# Patient Record
Sex: FEMALE | Race: WHITE | Employment: FULL TIME | URBAN - METROPOLITAN AREA
[De-identification: names, ages, dates, MRNs, and addresses within clinical notes are randomized per-mention and may not be internally consistent; named-entity substitution may affect disease eponyms.]

---

## 2017-01-16 ENCOUNTER — ROUTINE PRENATAL (OUTPATIENT)
Dept: OBGYN | Age: 27
End: 2017-01-16

## 2017-01-16 VITALS
DIASTOLIC BLOOD PRESSURE: 60 MMHG | SYSTOLIC BLOOD PRESSURE: 102 MMHG | HEART RATE: 55 BPM | WEIGHT: 142 LBS | BODY MASS INDEX: 22.92 KG/M2

## 2017-01-16 DIAGNOSIS — Z34.02 ENCOUNTER FOR SUPERVISION OF NORMAL FIRST PREGNANCY IN SECOND TRIMESTER: Primary | ICD-10-CM

## 2017-01-16 PROCEDURE — 0502F SUBSEQUENT PRENATAL CARE: CPT | Performed by: OBSTETRICS & GYNECOLOGY

## 2017-01-27 ENCOUNTER — HOSPITAL ENCOUNTER (OUTPATIENT)
Dept: ULTRASOUND IMAGING | Age: 27
Discharge: HOME | End: 2017-01-27
Payer: COMMERCIAL

## 2017-01-27 DIAGNOSIS — Z34.02 ENCOUNTER FOR SUPERVISION OF NORMAL FIRST PREGNANCY IN SECOND TRIMESTER: ICD-10-CM

## 2017-01-27 PROCEDURE — 76805 OB US >/= 14 WKS SNGL FETUS: CPT

## 2017-02-16 ENCOUNTER — ROUTINE PRENATAL (OUTPATIENT)
Dept: OBGYN | Age: 27
End: 2017-02-16

## 2017-02-16 VITALS
BODY MASS INDEX: 23.57 KG/M2 | HEART RATE: 56 BPM | DIASTOLIC BLOOD PRESSURE: 68 MMHG | SYSTOLIC BLOOD PRESSURE: 110 MMHG | WEIGHT: 146 LBS

## 2017-02-16 DIAGNOSIS — Z34.02 ENCOUNTER FOR SUPERVISION OF NORMAL FIRST PREGNANCY IN SECOND TRIMESTER: Primary | ICD-10-CM

## 2017-02-16 PROCEDURE — 0502F SUBSEQUENT PRENATAL CARE: CPT | Performed by: OBSTETRICS & GYNECOLOGY

## 2017-02-25 DIAGNOSIS — J06.9 ACUTE URI: Primary | ICD-10-CM

## 2017-02-25 RX ORDER — AZITHROMYCIN 250 MG/1
250 TABLET, FILM COATED ORAL DAILY
Qty: 10 TABLET | Refills: 0 | Status: SHIPPED | OUTPATIENT
Start: 2017-02-25 | End: 2017-03-07

## 2017-03-16 ENCOUNTER — ROUTINE PRENATAL (OUTPATIENT)
Dept: OBGYN | Age: 27
End: 2017-03-16

## 2017-03-16 VITALS
HEART RATE: 72 BPM | SYSTOLIC BLOOD PRESSURE: 112 MMHG | DIASTOLIC BLOOD PRESSURE: 72 MMHG | BODY MASS INDEX: 24.69 KG/M2 | WEIGHT: 153 LBS

## 2017-03-16 DIAGNOSIS — Z34.92 NORMAL PREGNANCY, SECOND TRIMESTER: Primary | ICD-10-CM

## 2017-03-16 DIAGNOSIS — Z34.92 NORMAL PREGNANCY, SECOND TRIMESTER: ICD-10-CM

## 2017-03-16 LAB
GLUCOSE, 1HR PP: 136 MG/DL (ref 60–140)
HCT VFR BLD CALC: 31.6 % (ref 37–47)
HEMOGLOBIN: 10.8 G/DL (ref 12–16)

## 2017-03-16 PROCEDURE — 0502F SUBSEQUENT PRENATAL CARE: CPT | Performed by: OBSTETRICS & GYNECOLOGY

## 2017-03-17 RX ORDER — FERROUS SULFATE 325(65) MG
325 TABLET ORAL
Qty: 90 TABLET | Refills: 3 | OUTPATIENT
Start: 2017-03-17 | End: 2019-05-28

## 2017-04-19 ENCOUNTER — ROUTINE PRENATAL (OUTPATIENT)
Dept: OBGYN | Age: 27
End: 2017-04-19

## 2017-04-19 VITALS
DIASTOLIC BLOOD PRESSURE: 60 MMHG | WEIGHT: 152 LBS | SYSTOLIC BLOOD PRESSURE: 108 MMHG | BODY MASS INDEX: 24.53 KG/M2 | HEART RATE: 53 BPM

## 2017-04-19 DIAGNOSIS — Z34.03 ENCOUNTER FOR SUPERVISION OF NORMAL FIRST PREGNANCY IN THIRD TRIMESTER: Primary | ICD-10-CM

## 2017-04-19 PROCEDURE — 0502F SUBSEQUENT PRENATAL CARE: CPT | Performed by: OBSTETRICS & GYNECOLOGY

## 2017-04-21 ENCOUNTER — TELEPHONE (OUTPATIENT)
Dept: OBGYN | Age: 27
End: 2017-04-21

## 2017-04-21 ENCOUNTER — HOSPITAL ENCOUNTER (OUTPATIENT)
Dept: ULTRASOUND IMAGING | Age: 27
Discharge: HOME | End: 2017-04-21
Payer: COMMERCIAL

## 2017-04-21 DIAGNOSIS — Z34.03 SUPERVISION OF NORMAL FIRST PREGNANCY IN THIRD TRIMESTER: ICD-10-CM

## 2017-04-21 DIAGNOSIS — Z34.03 SUPERVISION OF NORMAL FIRST PREGNANCY IN THIRD TRIMESTER: Primary | ICD-10-CM

## 2017-04-21 PROCEDURE — 76805 OB US >/= 14 WKS SNGL FETUS: CPT

## 2017-04-27 ENCOUNTER — TELEPHONE (OUTPATIENT)
Dept: OBGYN | Age: 27
End: 2017-04-27

## 2017-05-08 ENCOUNTER — ROUTINE PRENATAL (OUTPATIENT)
Dept: OBGYN | Age: 27
End: 2017-05-08

## 2017-05-08 VITALS
WEIGHT: 156 LBS | DIASTOLIC BLOOD PRESSURE: 62 MMHG | BODY MASS INDEX: 25.18 KG/M2 | SYSTOLIC BLOOD PRESSURE: 84 MMHG | HEART RATE: 60 BPM

## 2017-05-08 DIAGNOSIS — Z34.03 ENCOUNTER FOR SUPERVISION OF NORMAL FIRST PREGNANCY IN THIRD TRIMESTER: Primary | ICD-10-CM

## 2017-05-08 PROCEDURE — 1036F TOBACCO NON-USER: CPT | Performed by: NURSE PRACTITIONER

## 2017-05-08 PROCEDURE — G8427 DOCREV CUR MEDS BY ELIG CLIN: HCPCS | Performed by: NURSE PRACTITIONER

## 2017-05-08 PROCEDURE — 0502F SUBSEQUENT PRENATAL CARE: CPT | Performed by: NURSE PRACTITIONER

## 2017-05-08 PROCEDURE — G8419 CALC BMI OUT NRM PARAM NOF/U: HCPCS | Performed by: NURSE PRACTITIONER

## 2017-05-24 ENCOUNTER — ROUTINE PRENATAL (OUTPATIENT)
Dept: OBGYN | Age: 27
End: 2017-05-24

## 2017-05-24 VITALS
SYSTOLIC BLOOD PRESSURE: 110 MMHG | DIASTOLIC BLOOD PRESSURE: 72 MMHG | HEART RATE: 70 BPM | BODY MASS INDEX: 25.66 KG/M2 | WEIGHT: 159 LBS

## 2017-05-24 DIAGNOSIS — Z34.03 SUPERVISION OF NORMAL FIRST PREGNANCY IN THIRD TRIMESTER: ICD-10-CM

## 2017-05-24 DIAGNOSIS — Z34.03 SUPERVISION OF NORMAL FIRST PREGNANCY IN THIRD TRIMESTER: Primary | ICD-10-CM

## 2017-05-24 PROCEDURE — 0502F SUBSEQUENT PRENATAL CARE: CPT | Performed by: OBSTETRICS & GYNECOLOGY

## 2017-05-27 LAB — GROUP B STREP CULTURE: NORMAL

## 2017-05-29 RX ORDER — BREAST PUMP
1 EACH MISCELLANEOUS PRN
Qty: 1 EACH | Refills: 0 | Status: SHIPPED | OUTPATIENT
Start: 2017-05-29 | End: 2018-07-26

## 2017-05-31 ENCOUNTER — ROUTINE PRENATAL (OUTPATIENT)
Dept: OBGYN | Age: 27
End: 2017-05-31

## 2017-05-31 VITALS
BODY MASS INDEX: 25.34 KG/M2 | SYSTOLIC BLOOD PRESSURE: 112 MMHG | WEIGHT: 157 LBS | HEART RATE: 89 BPM | DIASTOLIC BLOOD PRESSURE: 72 MMHG

## 2017-05-31 DIAGNOSIS — Z34.93 NORMAL PREGNANCY, THIRD TRIMESTER: Primary | ICD-10-CM

## 2017-05-31 PROCEDURE — 0502F SUBSEQUENT PRENATAL CARE: CPT | Performed by: OBSTETRICS & GYNECOLOGY

## 2017-06-06 ENCOUNTER — ROUTINE PRENATAL (OUTPATIENT)
Dept: OBGYN | Age: 27
End: 2017-06-06

## 2017-06-06 VITALS
WEIGHT: 158 LBS | SYSTOLIC BLOOD PRESSURE: 120 MMHG | DIASTOLIC BLOOD PRESSURE: 70 MMHG | HEART RATE: 80 BPM | BODY MASS INDEX: 25.5 KG/M2

## 2017-06-06 DIAGNOSIS — Z34.03 ENCOUNTER FOR SUPERVISION OF NORMAL FIRST PREGNANCY IN THIRD TRIMESTER: Primary | ICD-10-CM

## 2017-06-06 PROCEDURE — 0502F SUBSEQUENT PRENATAL CARE: CPT | Performed by: OBSTETRICS & GYNECOLOGY

## 2017-06-14 ENCOUNTER — HOSPITAL ENCOUNTER (OUTPATIENT)
Dept: ULTRASOUND IMAGING | Age: 27
Discharge: HOME | End: 2017-06-14
Payer: COMMERCIAL

## 2017-06-14 DIAGNOSIS — Z34.03 SUPERVISION OF NORMAL FIRST PREGNANCY IN THIRD TRIMESTER: ICD-10-CM

## 2017-06-14 PROCEDURE — 76815 OB US LIMITED FETUS(S): CPT

## 2017-06-15 ENCOUNTER — ROUTINE PRENATAL (OUTPATIENT)
Dept: OBGYN | Age: 27
End: 2017-06-15

## 2017-06-15 VITALS — WEIGHT: 160 LBS | DIASTOLIC BLOOD PRESSURE: 70 MMHG | SYSTOLIC BLOOD PRESSURE: 124 MMHG | BODY MASS INDEX: 25.82 KG/M2

## 2017-06-15 DIAGNOSIS — Z34.93 NORMAL PREGNANCY, THIRD TRIMESTER: Primary | ICD-10-CM

## 2017-06-15 PROCEDURE — 0502F SUBSEQUENT PRENATAL CARE: CPT | Performed by: OBSTETRICS & GYNECOLOGY

## 2017-06-20 ENCOUNTER — ROUTINE PRENATAL (OUTPATIENT)
Dept: OBGYN | Age: 27
End: 2017-06-20

## 2017-06-20 VITALS
HEART RATE: 75 BPM | SYSTOLIC BLOOD PRESSURE: 110 MMHG | WEIGHT: 160 LBS | BODY MASS INDEX: 25.82 KG/M2 | DIASTOLIC BLOOD PRESSURE: 66 MMHG

## 2017-06-20 DIAGNOSIS — Z34.03 NORMAL FIRST PREGNANCY CONFIRMED, THIRD TRIMESTER: Primary | ICD-10-CM

## 2017-06-20 PROCEDURE — 0502F SUBSEQUENT PRENATAL CARE: CPT | Performed by: OBSTETRICS & GYNECOLOGY

## 2017-06-27 ENCOUNTER — ROUTINE PRENATAL (OUTPATIENT)
Dept: OBGYN | Age: 27
End: 2017-06-27

## 2017-06-27 ENCOUNTER — HOSPITAL ENCOUNTER (INPATIENT)
Age: 27
LOS: 3 days | Discharge: HOME | End: 2017-06-30
Attending: OBSTETRICS & GYNECOLOGY | Admitting: OBSTETRICS & GYNECOLOGY
Payer: COMMERCIAL

## 2017-06-27 VITALS
SYSTOLIC BLOOD PRESSURE: 108 MMHG | HEART RATE: 60 BPM | WEIGHT: 162 LBS | BODY MASS INDEX: 26.15 KG/M2 | DIASTOLIC BLOOD PRESSURE: 62 MMHG

## 2017-06-27 DIAGNOSIS — Z34.93 NORMAL PREGNANCY, THIRD TRIMESTER: Primary | ICD-10-CM

## 2017-06-27 LAB
ABO/RH: NORMAL
ANTIBODY SCREEN: NORMAL
BASOPHILS ABSOLUTE: 0 K/UL (ref 0–0.2)
BASOPHILS RELATIVE PERCENT: 0.4 %
BILIRUBIN URINE: NEGATIVE
BLOOD, URINE: NEGATIVE
CLARITY: CLEAR
COLOR: YELLOW
EOSINOPHILS ABSOLUTE: 0 K/UL (ref 0–0.7)
EOSINOPHILS RELATIVE PERCENT: 0.4 %
GLUCOSE URINE: NEGATIVE MG/DL
HCT VFR BLD CALC: 38.5 % (ref 37–47)
HEMOGLOBIN: 12.8 G/DL (ref 12–16)
KETONES, URINE: NEGATIVE MG/DL
LEUKOCYTE ESTERASE, URINE: NEGATIVE
LYMPHOCYTES ABSOLUTE: 1.7 K/UL (ref 1–4.8)
LYMPHOCYTES RELATIVE PERCENT: 13.7 %
MCH RBC QN AUTO: 32.1 PG (ref 27–31.3)
MCHC RBC AUTO-ENTMCNC: 33.3 % (ref 33–37)
MCV RBC AUTO: 96.4 FL (ref 82–100)
MONOCYTES ABSOLUTE: 0.9 K/UL (ref 0.2–0.8)
MONOCYTES RELATIVE PERCENT: 7.1 %
NEUTROPHILS ABSOLUTE: 9.4 K/UL (ref 1.4–6.5)
NEUTROPHILS RELATIVE PERCENT: 78.4 %
NITRITE, URINE: NEGATIVE
PDW BLD-RTO: 12.7 % (ref 11.5–14.5)
PH UA: 6 (ref 5–9)
PLATELET # BLD: 304 K/UL (ref 130–400)
PROTEIN UA: NEGATIVE MG/DL
RBC # BLD: 3.99 M/UL (ref 4.2–5.4)
RUBELLA ANTIBODY IGG: 90.9 IU/ML
SPECIFIC GRAVITY UA: 1.01 (ref 1–1.03)
UROBILINOGEN, URINE: 0.2 E.U./DL
WBC # BLD: 12 K/UL (ref 4.8–10.8)

## 2017-06-27 PROCEDURE — 86317 IMMUNOASSAY INFECTIOUS AGENT: CPT

## 2017-06-27 PROCEDURE — 81003 URINALYSIS AUTO W/O SCOPE: CPT

## 2017-06-27 PROCEDURE — 0502F SUBSEQUENT PRENATAL CARE: CPT | Performed by: OBSTETRICS & GYNECOLOGY

## 2017-06-27 PROCEDURE — 85025 COMPLETE CBC W/AUTO DIFF WBC: CPT

## 2017-06-27 PROCEDURE — 36415 COLL VENOUS BLD VENIPUNCTURE: CPT

## 2017-06-27 PROCEDURE — 6370000000 HC RX 637 (ALT 250 FOR IP): Performed by: OBSTETRICS & GYNECOLOGY

## 2017-06-27 PROCEDURE — 4A1HXCZ MONITORING OF PRODUCTS OF CONCEPTION, CARDIAC RATE, EXTERNAL APPROACH: ICD-10-PCS | Performed by: OBSTETRICS & GYNECOLOGY

## 2017-06-27 PROCEDURE — 86901 BLOOD TYPING SEROLOGIC RH(D): CPT

## 2017-06-27 PROCEDURE — 86900 BLOOD TYPING SEROLOGIC ABO: CPT

## 2017-06-27 PROCEDURE — 3E0P7GC INTRODUCTION OF OTHER THERAPEUTIC SUBSTANCE INTO FEMALE REPRODUCTIVE, VIA NATURAL OR ARTIFICIAL OPENING: ICD-10-PCS | Performed by: OBSTETRICS & GYNECOLOGY

## 2017-06-27 PROCEDURE — 87340 HEPATITIS B SURFACE AG IA: CPT

## 2017-06-27 PROCEDURE — 1220000000 HC SEMI PRIVATE OB R&B

## 2017-06-27 PROCEDURE — 2580000003 HC RX 258: Performed by: OBSTETRICS & GYNECOLOGY

## 2017-06-27 PROCEDURE — 86850 RBC ANTIBODY SCREEN: CPT

## 2017-06-27 PROCEDURE — 88307 TISSUE EXAM BY PATHOLOGIST: CPT

## 2017-06-27 PROCEDURE — 86592 SYPHILIS TEST NON-TREP QUAL: CPT

## 2017-06-27 RX ORDER — DIPHENHYDRAMINE HYDROCHLORIDE 50 MG/ML
25 INJECTION INTRAMUSCULAR; INTRAVENOUS EVERY 4 HOURS PRN
Status: DISCONTINUED | OUTPATIENT
Start: 2017-06-27 | End: 2017-06-30 | Stop reason: HOSPADM

## 2017-06-27 RX ORDER — SODIUM CHLORIDE 0.9 % (FLUSH) 0.9 %
10 SYRINGE (ML) INJECTION EVERY 12 HOURS SCHEDULED
Status: DISCONTINUED | OUTPATIENT
Start: 2017-06-27 | End: 2017-06-30 | Stop reason: HOSPADM

## 2017-06-27 RX ORDER — SODIUM CHLORIDE, SODIUM LACTATE, POTASSIUM CHLORIDE, CALCIUM CHLORIDE 600; 310; 30; 20 MG/100ML; MG/100ML; MG/100ML; MG/100ML
INJECTION, SOLUTION INTRAVENOUS CONTINUOUS
Status: DISCONTINUED | OUTPATIENT
Start: 2017-06-27 | End: 2017-06-30 | Stop reason: HOSPADM

## 2017-06-27 RX ORDER — ONDANSETRON 2 MG/ML
4 INJECTION INTRAMUSCULAR; INTRAVENOUS EVERY 6 HOURS PRN
Status: DISCONTINUED | OUTPATIENT
Start: 2017-06-27 | End: 2017-06-30 | Stop reason: HOSPADM

## 2017-06-27 RX ORDER — NALBUPHINE HCL 10 MG/ML
10 AMPUL (ML) INJECTION
Status: COMPLETED | OUTPATIENT
Start: 2017-06-27 | End: 2017-06-28

## 2017-06-27 RX ORDER — TERBUTALINE SULFATE 1 MG/ML
0.25 INJECTION, SOLUTION SUBCUTANEOUS ONCE
Status: DISCONTINUED | OUTPATIENT
Start: 2017-06-27 | End: 2017-06-30 | Stop reason: HOSPADM

## 2017-06-27 RX ADMIN — SODIUM CHLORIDE, PRESERVATIVE FREE 10 ML: 5 INJECTION INTRAVENOUS at 21:41

## 2017-06-27 RX ADMIN — DINOPROSTONE 10 MG: 10 INSERT VAGINAL at 21:40

## 2017-06-27 ASSESSMENT — PAIN SCALES - GENERAL: PAINLEVEL_OUTOF10: 0

## 2017-06-28 ENCOUNTER — ANESTHESIA (OUTPATIENT)
Dept: LABOR AND DELIVERY | Age: 27
End: 2017-06-28
Payer: COMMERCIAL

## 2017-06-28 ENCOUNTER — ANESTHESIA EVENT (OUTPATIENT)
Dept: LABOR AND DELIVERY | Age: 27
End: 2017-06-28
Payer: COMMERCIAL

## 2017-06-28 VITALS — DIASTOLIC BLOOD PRESSURE: 57 MMHG | SYSTOLIC BLOOD PRESSURE: 115 MMHG

## 2017-06-28 LAB
HEPATITIS B SURFACE ANTIGEN INTERPRETATION: NORMAL
RPR: NORMAL

## 2017-06-28 PROCEDURE — 3700000025 ANESTHESIA EPIDURAL BLOCK: Performed by: NURSE ANESTHETIST, CERTIFIED REGISTERED

## 2017-06-28 PROCEDURE — 1220000000 HC SEMI PRIVATE OB R&B

## 2017-06-28 PROCEDURE — 2580000003 HC RX 258: Performed by: OBSTETRICS & GYNECOLOGY

## 2017-06-28 PROCEDURE — 6370000000 HC RX 637 (ALT 250 FOR IP): Performed by: OBSTETRICS & GYNECOLOGY

## 2017-06-28 PROCEDURE — 59400 OBSTETRICAL CARE: CPT | Performed by: OBSTETRICS & GYNECOLOGY

## 2017-06-28 PROCEDURE — 2500000003 HC RX 250 WO HCPCS: Performed by: OBSTETRICS & GYNECOLOGY

## 2017-06-28 PROCEDURE — 6360000002 HC RX W HCPCS: Performed by: OBSTETRICS & GYNECOLOGY

## 2017-06-28 RX ORDER — LIDOCAINE HYDROCHLORIDE 10 MG/ML
30 INJECTION, SOLUTION EPIDURAL; INFILTRATION; INTRACAUDAL; PERINEURAL PRN
Status: DISCONTINUED | OUTPATIENT
Start: 2017-06-28 | End: 2017-06-30 | Stop reason: HOSPADM

## 2017-06-28 RX ORDER — DOCUSATE SODIUM 100 MG/1
100 CAPSULE, LIQUID FILLED ORAL 2 TIMES DAILY
Status: DISCONTINUED | OUTPATIENT
Start: 2017-06-28 | End: 2017-06-30 | Stop reason: HOSPADM

## 2017-06-28 RX ORDER — SIMETHICONE 80 MG
80 TABLET,CHEWABLE ORAL EVERY 6 HOURS PRN
Status: DISCONTINUED | OUTPATIENT
Start: 2017-06-28 | End: 2017-06-30 | Stop reason: HOSPADM

## 2017-06-28 RX ORDER — HYDROCODONE BITARTRATE AND ACETAMINOPHEN 5; 325 MG/1; MG/1
1 TABLET ORAL EVERY 6 HOURS PRN
Qty: 30 TABLET | Refills: 0 | Status: SHIPPED | OUTPATIENT
Start: 2017-06-28 | End: 2017-07-05

## 2017-06-28 RX ORDER — SODIUM CHLORIDE 0.9 % (FLUSH) 0.9 %
10 SYRINGE (ML) INJECTION PRN
Status: DISCONTINUED | OUTPATIENT
Start: 2017-06-28 | End: 2017-06-30 | Stop reason: HOSPADM

## 2017-06-28 RX ORDER — IBUPROFEN 800 MG/1
800 TABLET ORAL EVERY 8 HOURS SCHEDULED
Status: DISCONTINUED | OUTPATIENT
Start: 2017-06-28 | End: 2017-06-30 | Stop reason: HOSPADM

## 2017-06-28 RX ORDER — HYDROCODONE BITARTRATE AND ACETAMINOPHEN 5; 325 MG/1; MG/1
2 TABLET ORAL EVERY 4 HOURS PRN
Status: DISCONTINUED | OUTPATIENT
Start: 2017-06-28 | End: 2017-06-30 | Stop reason: HOSPADM

## 2017-06-28 RX ORDER — MISOPROSTOL 200 UG/1
800 TABLET ORAL PRN
Status: DISCONTINUED | OUTPATIENT
Start: 2017-06-28 | End: 2017-06-30 | Stop reason: HOSPADM

## 2017-06-28 RX ORDER — BISACODYL 10 MG
10 SUPPOSITORY, RECTAL RECTAL DAILY PRN
Status: DISCONTINUED | OUTPATIENT
Start: 2017-06-28 | End: 2017-06-30 | Stop reason: HOSPADM

## 2017-06-28 RX ORDER — METHYLERGONOVINE MALEATE 0.2 MG/ML
200 INJECTION INTRAVENOUS PRN
Status: DISCONTINUED | OUTPATIENT
Start: 2017-06-28 | End: 2017-06-30 | Stop reason: HOSPADM

## 2017-06-28 RX ORDER — HYDROCODONE BITARTRATE AND ACETAMINOPHEN 5; 325 MG/1; MG/1
1 TABLET ORAL EVERY 4 HOURS PRN
Status: DISCONTINUED | OUTPATIENT
Start: 2017-06-28 | End: 2017-06-30 | Stop reason: HOSPADM

## 2017-06-28 RX ORDER — NALBUPHINE HCL 10 MG/ML
5 AMPUL (ML) INJECTION EVERY 4 HOURS PRN
Status: DISCONTINUED | OUTPATIENT
Start: 2017-06-28 | End: 2017-06-30 | Stop reason: HOSPADM

## 2017-06-28 RX ORDER — CARBOPROST TROMETHAMINE 250 UG/ML
250 INJECTION, SOLUTION INTRAMUSCULAR PRN
Status: DISCONTINUED | OUTPATIENT
Start: 2017-06-28 | End: 2017-06-30 | Stop reason: HOSPADM

## 2017-06-28 RX ORDER — NALOXONE HYDROCHLORIDE 0.4 MG/ML
0.4 INJECTION, SOLUTION INTRAMUSCULAR; INTRAVENOUS; SUBCUTANEOUS PRN
Status: DISCONTINUED | OUTPATIENT
Start: 2017-06-28 | End: 2017-06-30 | Stop reason: HOSPADM

## 2017-06-28 RX ADMIN — NALBUPHINE HYDROCHLORIDE 10 MG: 10 INJECTION, SOLUTION INTRAMUSCULAR; INTRAVENOUS; SUBCUTANEOUS at 09:05

## 2017-06-28 RX ADMIN — Medication 12 ML/HR: at 11:45

## 2017-06-28 RX ADMIN — DOCUSATE SODIUM 100 MG: 100 CAPSULE ORAL at 21:23

## 2017-06-28 RX ADMIN — SODIUM CHLORIDE, POTASSIUM CHLORIDE, SODIUM LACTATE AND CALCIUM CHLORIDE: 600; 310; 30; 20 INJECTION, SOLUTION INTRAVENOUS at 06:28

## 2017-06-28 RX ADMIN — SODIUM CHLORIDE, POTASSIUM CHLORIDE, SODIUM LACTATE AND CALCIUM CHLORIDE 666 ML/HR: 600; 310; 30; 20 INJECTION, SOLUTION INTRAVENOUS at 11:08

## 2017-06-28 RX ADMIN — IBUPROFEN 800 MG: 800 TABLET ORAL at 21:23

## 2017-06-28 RX ADMIN — Medication 1 MILLI-UNITS/MIN: at 08:18

## 2017-06-28 RX ADMIN — SODIUM CHLORIDE, PRESERVATIVE FREE 10 ML: 5 INJECTION INTRAVENOUS at 21:23

## 2017-06-28 RX ADMIN — SODIUM CHLORIDE, POTASSIUM CHLORIDE, SODIUM LACTATE AND CALCIUM CHLORIDE: 600; 310; 30; 20 INJECTION, SOLUTION INTRAVENOUS at 12:28

## 2017-06-28 RX ADMIN — SODIUM CHLORIDE, PRESERVATIVE FREE 10 ML: 5 INJECTION INTRAVENOUS at 09:08

## 2017-06-28 RX ADMIN — NALBUPHINE HYDROCHLORIDE 10 MG: 10 INJECTION, SOLUTION INTRAMUSCULAR; INTRAVENOUS; SUBCUTANEOUS at 06:31

## 2017-06-28 RX ADMIN — SODIUM CHLORIDE, POTASSIUM CHLORIDE, SODIUM LACTATE AND CALCIUM CHLORIDE 999 ML/HR: 600; 310; 30; 20 INJECTION, SOLUTION INTRAVENOUS at 11:27

## 2017-06-28 ASSESSMENT — PAIN SCALES - GENERAL
PAINLEVEL_OUTOF10: 7
PAINLEVEL_OUTOF10: 7
PAINLEVEL_OUTOF10: 2

## 2017-06-29 PROCEDURE — 99231 SBSQ HOSP IP/OBS SF/LOW 25: CPT | Performed by: OBSTETRICS & GYNECOLOGY

## 2017-06-29 PROCEDURE — 6370000000 HC RX 637 (ALT 250 FOR IP): Performed by: OBSTETRICS & GYNECOLOGY

## 2017-06-29 PROCEDURE — 1220000000 HC SEMI PRIVATE OB R&B

## 2017-06-29 RX ORDER — LANOLIN 100 %
OINTMENT (GRAM) TOPICAL PRN
Status: DISCONTINUED | OUTPATIENT
Start: 2017-06-29 | End: 2017-06-30 | Stop reason: HOSPADM

## 2017-06-29 RX ADMIN — DOCUSATE SODIUM 100 MG: 100 CAPSULE ORAL at 09:40

## 2017-06-29 RX ADMIN — DOCUSATE SODIUM 100 MG: 100 CAPSULE ORAL at 21:24

## 2017-06-29 RX ADMIN — IBUPROFEN 800 MG: 800 TABLET ORAL at 21:24

## 2017-06-29 RX ADMIN — Medication: at 09:40

## 2017-06-29 RX ADMIN — IBUPROFEN 800 MG: 800 TABLET ORAL at 08:12

## 2017-06-29 ASSESSMENT — PAIN SCALES - GENERAL
PAINLEVEL_OUTOF10: 0
PAINLEVEL_OUTOF10: 3
PAINLEVEL_OUTOF10: 3

## 2017-06-30 VITALS
DIASTOLIC BLOOD PRESSURE: 75 MMHG | RESPIRATION RATE: 18 BRPM | HEART RATE: 77 BPM | TEMPERATURE: 98.2 F | BODY MASS INDEX: 25.71 KG/M2 | OXYGEN SATURATION: 97 % | WEIGHT: 160 LBS | SYSTOLIC BLOOD PRESSURE: 136 MMHG | HEIGHT: 66 IN

## 2017-06-30 PROCEDURE — 6360000002 HC RX W HCPCS: Performed by: OBSTETRICS & GYNECOLOGY

## 2017-06-30 PROCEDURE — 6370000000 HC RX 637 (ALT 250 FOR IP): Performed by: OBSTETRICS & GYNECOLOGY

## 2017-06-30 PROCEDURE — 99231 SBSQ HOSP IP/OBS SF/LOW 25: CPT | Performed by: OBSTETRICS & GYNECOLOGY

## 2017-06-30 PROCEDURE — 90471 IMMUNIZATION ADMIN: CPT | Performed by: OBSTETRICS & GYNECOLOGY

## 2017-06-30 PROCEDURE — 7200000001 HC VAGINAL DELIVERY

## 2017-06-30 PROCEDURE — 90715 TDAP VACCINE 7 YRS/> IM: CPT | Performed by: OBSTETRICS & GYNECOLOGY

## 2017-06-30 RX ADMIN — DOCUSATE SODIUM 100 MG: 100 CAPSULE ORAL at 09:54

## 2017-06-30 RX ADMIN — IBUPROFEN 800 MG: 800 TABLET ORAL at 06:45

## 2017-06-30 RX ADMIN — TETANUS TOXOID, REDUCED DIPHTHERIA TOXOID AND ACELLULAR PERTUSSIS VACCINE, ADSORBED 0.5 ML: 5; 2.5; 8; 8; 2.5 SUSPENSION INTRAMUSCULAR at 14:22

## 2017-06-30 ASSESSMENT — PAIN SCALES - GENERAL: PAINLEVEL_OUTOF10: 2

## 2017-08-09 ENCOUNTER — OFFICE VISIT (OUTPATIENT)
Dept: OBGYN | Age: 27
End: 2017-08-09

## 2017-08-09 VITALS
WEIGHT: 140 LBS | SYSTOLIC BLOOD PRESSURE: 116 MMHG | BODY MASS INDEX: 22.5 KG/M2 | HEIGHT: 66 IN | DIASTOLIC BLOOD PRESSURE: 68 MMHG

## 2017-08-09 PROCEDURE — 0503F POSTPARTUM CARE VISIT: CPT | Performed by: OBSTETRICS & GYNECOLOGY

## 2017-08-09 ASSESSMENT — ENCOUNTER SYMPTOMS
RESPIRATORY NEGATIVE: 1
VOMITING: 0
DIARRHEA: 0
CONSTIPATION: 0
ALLERGIC/IMMUNOLOGIC NEGATIVE: 1
EYES NEGATIVE: 1
RECTAL PAIN: 0
BLOOD IN STOOL: 0
NAUSEA: 0
ANAL BLEEDING: 0
ABDOMINAL DISTENTION: 0
ABDOMINAL PAIN: 0

## 2017-11-10 ENCOUNTER — TELEPHONE (OUTPATIENT)
Dept: OBGYN | Age: 27
End: 2017-11-10

## 2017-11-10 NOTE — TELEPHONE ENCOUNTER
Martha Odom is calling for results of her labs. Performed on: 11/28/16 by Dr. Claudell Michaels: Martha Odom can be reached at [phone] . OK to leave detailed message?  yes

## 2017-11-14 NOTE — TELEPHONE ENCOUNTER
Tried calling pt back to let her know we did get the cystic fibrosis results back and it does show she is a carrier, voicemail was full and could not leave a message

## 2017-12-18 ENCOUNTER — TELEPHONE (OUTPATIENT)
Dept: OBGYN | Age: 27
End: 2017-12-18

## 2017-12-18 DIAGNOSIS — N91.2 AMENORRHEA: Primary | ICD-10-CM

## 2017-12-18 NOTE — TELEPHONE ENCOUNTER
Peg Alvarenga is calling in to schedule an appointment for amenorrhea. Patients LMP:10/28/17 her appointment is scheduled for Future Appointments  Date Time Provider Valentin Celeste   12/29/2017 9:45 AM [address]   1/4/2018 2:15 PM Chloe Summers MD [facility]   . Please place US order for Peg Alvarenga.

## 2017-12-29 ENCOUNTER — HOSPITAL ENCOUNTER (OUTPATIENT)
Dept: ULTRASOUND IMAGING | Age: 27
Discharge: HOME | End: 2017-12-29
Payer: COMMERCIAL

## 2017-12-29 DIAGNOSIS — N91.2 AMENORRHEA: ICD-10-CM

## 2017-12-29 PROCEDURE — 76801 OB US < 14 WKS SINGLE FETUS: CPT

## 2018-01-04 ENCOUNTER — OFFICE VISIT (OUTPATIENT)
Dept: OBGYN | Age: 28
End: 2018-01-04

## 2018-01-04 VITALS
DIASTOLIC BLOOD PRESSURE: 64 MMHG | WEIGHT: 140 LBS | SYSTOLIC BLOOD PRESSURE: 110 MMHG | BODY MASS INDEX: 22.5 KG/M2 | HEIGHT: 66 IN

## 2018-01-04 DIAGNOSIS — N91.2 AMENORRHEA: ICD-10-CM

## 2018-01-04 DIAGNOSIS — N91.2 AMENORRHEA: Primary | ICD-10-CM

## 2018-01-04 LAB
BASOPHILS ABSOLUTE: 0.1 K/UL (ref 0–0.2)
BASOPHILS RELATIVE PERCENT: 0.6 %
BILIRUBIN URINE: NEGATIVE
BLOOD, URINE: NEGATIVE
CLARITY: CLEAR
COLOR: YELLOW
EOSINOPHILS ABSOLUTE: 0.1 K/UL (ref 0–0.7)
EOSINOPHILS RELATIVE PERCENT: 0.6 %
GLUCOSE URINE: NEGATIVE MG/DL
HCT VFR BLD CALC: 38.1 % (ref 37–47)
HEMOGLOBIN: 13 G/DL (ref 12–16)
HEPATITIS B SURFACE ANTIGEN INTERPRETATION: NORMAL
KETONES, URINE: NEGATIVE MG/DL
LEUKOCYTE ESTERASE, URINE: NEGATIVE
LYMPHOCYTES ABSOLUTE: 1.4 K/UL (ref 1–4.8)
LYMPHOCYTES RELATIVE PERCENT: 14.6 %
MCH RBC QN AUTO: 30.7 PG (ref 27–31.3)
MCHC RBC AUTO-ENTMCNC: 34.1 % (ref 33–37)
MCV RBC AUTO: 89.9 FL (ref 82–100)
MONOCYTES ABSOLUTE: 0.6 K/UL (ref 0.2–0.8)
MONOCYTES RELATIVE PERCENT: 6.2 %
NEUTROPHILS ABSOLUTE: 7.5 K/UL (ref 1.4–6.5)
NEUTROPHILS RELATIVE PERCENT: 78 %
NITRITE, URINE: NEGATIVE
PDW BLD-RTO: 12.8 % (ref 11.5–14.5)
PH UA: 6 (ref 5–9)
PLATELET # BLD: 372 K/UL (ref 130–400)
PROTEIN UA: NEGATIVE MG/DL
RBC # BLD: 4.24 M/UL (ref 4.2–5.4)
RUBELLA ANTIBODY IGG: 102 IU/ML
SPECIFIC GRAVITY UA: 1.01 (ref 1–1.03)
UROBILINOGEN, URINE: 0.2 E.U./DL
WBC # BLD: 9.6 K/UL (ref 4.8–10.8)

## 2018-01-04 PROCEDURE — 0500F INITIAL PRENATAL CARE VISIT: CPT | Performed by: OBSTETRICS & GYNECOLOGY

## 2018-01-04 RX ORDER — PRENATAL 71/IRON/FOLIC AC/DHA 30-1.4-2
CAPSULE,IMMEDIATE, DELAY RELEASE,BIPHASE ORAL
Qty: 6 CAPSULE | Refills: 0
Start: 2018-01-04 | End: 2019-05-28

## 2018-01-04 ASSESSMENT — ENCOUNTER SYMPTOMS
BLOOD IN STOOL: 0
EYES NEGATIVE: 1
NAUSEA: 0
RESPIRATORY NEGATIVE: 1
ANAL BLEEDING: 0
CONSTIPATION: 0
VOMITING: 0
ABDOMINAL PAIN: 0
DIARRHEA: 0
RECTAL PAIN: 0
ABDOMINAL DISTENTION: 0
ALLERGIC/IMMUNOLOGIC NEGATIVE: 1

## 2018-01-05 LAB
ABO/RH: NORMAL
ANTIBODY SCREEN: NORMAL

## 2018-01-05 NOTE — PROGRESS NOTES
dry. She is not diaphoretic. No pallor. Psychiatric: She has a normal mood and affect. Her behavior is normal. Judgment and thought content normal.       Assessment:      1. Amenorrhea  CBC Auto Differential    Hepatitis B Surface Antigen    Pain Management Drug Screen    Rubella antibody, IgG    Type and screen    Urine Culture    Urinalysis with Microscopic    Varicella Zoster Antibody, IgG         Plan:      Medications placed this encounter:  No orders of the defined types were placed in this encounter. Orders placed this encounter:  Orders Placed This Encounter   Procedures    Urine Culture     Standing Status:   Future     Number of Occurrences:   1     Standing Expiration Date:   1/4/2019     Order Specific Question:   Specify (ex-cath, midstream, cysto, etc)? Answer:   midstream    CBC Auto Differential     Standing Status:   Future     Number of Occurrences:   1     Standing Expiration Date:   1/4/2019    Hepatitis B Surface Antigen     Standing Status:   Future     Number of Occurrences:   1     Standing Expiration Date:   1/4/2019    Pain Management Drug Screen     Standing Status:   Future     Number of Occurrences:   1     Standing Expiration Date:   1/4/2019    Rubella antibody, IgG     Standing Status:   Future     Number of Occurrences:   1     Standing Expiration Date:   1/4/2019    Urinalysis with Microscopic     Standing Status:   Future     Number of Occurrences:   1     Standing Expiration Date:   1/4/2019     Order Specific Question:   SPECIFY(EX-CATH,MIDSTREAM,CYSTO,ETC)? Answer:   midstream    Varicella Zoster Antibody, IgG     Standing Status:   Future     Number of Occurrences:   1     Standing Expiration Date:   1/4/2019    Type and screen     Standing Status:   Future     Number of Occurrences:   1     Standing Expiration Date:   1/4/2019         Follow up:  Return in about 2 weeks (around 1/18/2018).

## 2018-01-06 LAB — URINE CULTURE, ROUTINE: NORMAL

## 2018-01-07 LAB
6-ACETYLMORPHINE: NOT DETECTED
7-AMINOCLONAZEPAM: NOT DETECTED
ALPHA-OH-ALPRAZOLAM: NOT DETECTED
ALPRAZOLAM: NOT DETECTED
AMPHETAMINE: NOT DETECTED
BARBITURATES: NOT DETECTED
BENZOYLECGONINE: NOT DETECTED
BUPRENORPHINE: NOT DETECTED
CARISOPRODOL: NOT DETECTED
CLONAZEPAM: NOT DETECTED
CODEINE: NOT DETECTED
CREATININE URINE: 77.2 MG/DL (ref 20–400)
DIAZEPAM: NOT DETECTED
EER PAIN MGT DRUG PANEL, HIGH RES/EMIT U: NORMAL
ETHYL GLUCURONIDE: NOT DETECTED
FENTANYL: NOT DETECTED
HYDROCODONE: NOT DETECTED
HYDROMORPHONE: NOT DETECTED
LORAZEPAM: NOT DETECTED
MARIJUANA METABOLITE: NOT DETECTED
MDA: NOT DETECTED
MDEA: NOT DETECTED
MDMA URINE: NOT DETECTED
MEPERIDINE: NOT DETECTED
METHADONE: NOT DETECTED
METHAMPHETAMINE: NOT DETECTED
METHYLPHENIDATE: NOT DETECTED
MIDAZOLAM: NOT DETECTED
MORPHINE: NOT DETECTED
NORBUPRENORPHINE, FREE: NOT DETECTED
NORDIAZEPAM: NOT DETECTED
NORFENTANYL: NOT DETECTED
NORHYDROCODONE, URINE: NOT DETECTED
NOROXYCODONE: NOT DETECTED
NOROXYMORPHONE, URINE: NOT DETECTED
OXAZEPAM: NOT DETECTED
OXYCODONE: NOT DETECTED
OXYMORPHONE: NOT DETECTED
PAIN MANAGEMENT DRUG PANEL: NORMAL
PCP: NOT DETECTED
PHENTERMINE: NOT DETECTED
PROPOXYPHENE: NOT DETECTED
TAPENTADOL, URINE: NOT DETECTED
TAPENTADOL-O-SULFATE, URINE: NOT DETECTED
TEMAZEPAM: NOT DETECTED
TRAMADOL: NOT DETECTED
VZV IGG SER QL IA: 646 IV
ZOLPIDEM: NOT DETECTED

## 2018-01-16 ENCOUNTER — TELEPHONE (OUTPATIENT)
Dept: OBGYN | Age: 28
End: 2018-01-16

## 2018-01-16 RX ORDER — AZITHROMYCIN 250 MG/1
250 TABLET, FILM COATED ORAL SEE ADMIN INSTRUCTIONS
Qty: 1 PACKET | Refills: 0 | Status: SHIPPED | OUTPATIENT
Start: 2018-01-16 | End: 2018-07-12 | Stop reason: ALTCHOICE

## 2018-01-18 ENCOUNTER — INITIAL PRENATAL (OUTPATIENT)
Dept: OBGYN | Age: 28
End: 2018-01-18

## 2018-01-18 VITALS — DIASTOLIC BLOOD PRESSURE: 70 MMHG | SYSTOLIC BLOOD PRESSURE: 118 MMHG | WEIGHT: 141 LBS | BODY MASS INDEX: 22.76 KG/M2

## 2018-01-18 DIAGNOSIS — Z11.3 ROUTINE SCREENING FOR STI (SEXUALLY TRANSMITTED INFECTION): ICD-10-CM

## 2018-01-18 DIAGNOSIS — Z3A.11 11 WEEKS GESTATION OF PREGNANCY: ICD-10-CM

## 2018-01-18 DIAGNOSIS — Z11.51 SPECIAL SCREENING EXAMINATION FOR HUMAN PAPILLOMAVIRUS (HPV): ICD-10-CM

## 2018-01-18 DIAGNOSIS — Z34.91 NORMAL PREGNANCY IN FIRST TRIMESTER: Primary | ICD-10-CM

## 2018-01-18 DIAGNOSIS — Z34.91 NORMAL PREGNANCY IN FIRST TRIMESTER: ICD-10-CM

## 2018-01-18 PROCEDURE — 0500F INITIAL PRENATAL CARE VISIT: CPT | Performed by: OBSTETRICS & GYNECOLOGY

## 2018-01-18 ASSESSMENT — PATIENT HEALTH QUESTIONNAIRE - PHQ9
SUM OF ALL RESPONSES TO PHQ9 QUESTIONS 1 & 2: 0
2. FEELING DOWN, DEPRESSED OR HOPELESS: 0
1. LITTLE INTEREST OR PLEASURE IN DOING THINGS: 0
SUM OF ALL RESPONSES TO PHQ QUESTIONS 1-9: 0

## 2018-01-22 NOTE — PROGRESS NOTES
Known Problems Mother     No Known Problems Brother     No Known Problems Sister     No Known Problems Other     Breast Cancer Neg Hx     Cancer Neg Hx     Colon Cancer Neg Hx     Diabetes Neg Hx     Eclampsia Neg Hx     Hypertension Neg Hx     Ovarian Cancer Neg Hx      Labor Neg Hx     Spont Abortions Neg Hx     Stroke Neg Hx        Review of Systems   Constitutional: Negative. HENT: Negative. Eyes: Negative. Respiratory: Negative. Cardiovascular: Negative. Gastrointestinal: Negative. Endocrine: Negative. Genitourinary: Negative. Musculoskeletal: Negative. Skin: Negative. Allergic/Immunologic: Negative. Neurological: Negative. Hematological: Negative. Psychiatric/Behavioral: Negative. Physical Exam   Constitutional: She is oriented to person, place, and time. She appears well-developed and well-nourished. HENT:   Head: Normocephalic. Neck: Normal range of motion. Neck supple. No thyromegaly present. Cardiovascular: Normal rate, regular rhythm and normal heart sounds. Pulmonary/Chest: Effort normal and breath sounds normal. No respiratory distress. She has no wheezes. She has no rales. She exhibits no tenderness. Abdominal: Soft. Bowel sounds are normal. She exhibits no distension and no mass. There is no tenderness. There is no rebound and no guarding. Hernia confirmed negative in the right inguinal area and confirmed negative in the left inguinal area. Genitourinary: Rectum normal, vagina normal and uterus normal. No breast swelling, tenderness, discharge or bleeding. No labial fusion. There is no rash, tenderness, lesion or injury on the right labia. There is no rash, tenderness, lesion or injury on the left labia. Uterus is not deviated, not enlarged, not fixed and not tender. Cervix exhibits no motion tenderness, no discharge and no friability. Right adnexum displays no mass, no tenderness and no fullness.  Left adnexum displays no mass, no tenderness and no fullness. No erythema, tenderness or bleeding in the vagina. No foreign body around the vagina. No signs of injury around the vagina. No vaginal discharge found. Musculoskeletal: Normal range of motion. She exhibits no edema or tenderness. Lymphadenopathy:     She has no cervical adenopathy. Right: No inguinal adenopathy present. Left: No inguinal adenopathy present. Neurological: She is alert and oriented to person, place, and time. Skin: Skin is warm and dry. No rash noted. No erythema. No pallor. Psychiatric: She has a normal mood and affect. Her behavior is normal. Judgment and thought content normal.     1. Normal pregnancy in first trimester  Prenatal Testing for Fetal Aneuploidy    PAP SMEAR   2. 11 weeks gestation of pregnancy     3. Special screening examination for human papillomavirus (HPV)  PAP SMEAR   4. Routine screening for STI (sexually transmitted infection)  PAP SMEAR       Medications placed this encounter:  No orders of the defined types were placed in this encounter. Orders placed this encounter:  Orders Placed This Encounter   Procedures    Prenatal Testing for Fetal Aneuploidy     Standing Status:   Future     Number of Occurrences:   1     Standing Expiration Date:   1/18/2019    PAP SMEAR     Standing Status:   Future     Standing Expiration Date:   1/18/2019     Order Specific Question:   Collection Type     Answer: Thin Prep     Order Specific Question:   Prior Abnormal Pap Test     Answer:   No     Order Specific Question:   Screening or Diagnostic     Answer:   Screening     Order Specific Question:   HPV Requested? Answer:   Yes     Order Specific Question:   High Risk Patient     Answer:   N/A         Follow up:  Return in about 4 weeks (around 2/15/2018) for Routine OB.

## 2018-01-24 LAB
EER NON INVASIVE PRENATAL ANEUPLOIDY: NORMAL
FETAL FRACTION: 7.4
FETAL GENDER: NORMAL
GESTATIONAL AGE (DAYS): 5
GESTATIONAL AGE(WEEKS): 11
Lab: NORMAL
MATERNAL WEIGHT: 141
MONOSOMY X: NORMAL
REPORT FETUS GENDER: YES
TRIPLOIDY (VANISHING TWIN): NORMAL
TRISOMY 13 RISK: NORMAL
TRISOMY 18 RISK ASSESSMENT: NORMAL
TRISOMY 21 RISK: NORMAL

## 2018-01-26 DIAGNOSIS — Z11.51 SPECIAL SCREENING EXAMINATION FOR HUMAN PAPILLOMAVIRUS (HPV): ICD-10-CM

## 2018-01-26 DIAGNOSIS — Z34.91 NORMAL PREGNANCY IN FIRST TRIMESTER: ICD-10-CM

## 2018-01-26 DIAGNOSIS — Z11.3 ROUTINE SCREENING FOR STI (SEXUALLY TRANSMITTED INFECTION): ICD-10-CM

## 2018-02-19 ENCOUNTER — ROUTINE PRENATAL (OUTPATIENT)
Dept: OBGYN CLINIC | Age: 28
End: 2018-02-19

## 2018-02-19 VITALS
HEART RATE: 82 BPM | SYSTOLIC BLOOD PRESSURE: 120 MMHG | BODY MASS INDEX: 23.08 KG/M2 | WEIGHT: 143 LBS | DIASTOLIC BLOOD PRESSURE: 72 MMHG

## 2018-02-19 DIAGNOSIS — Z34.92 NORMAL PREGNANCY IN SECOND TRIMESTER: Primary | ICD-10-CM

## 2018-02-19 DIAGNOSIS — Z3A.16 16 WEEKS GESTATION OF PREGNANCY: ICD-10-CM

## 2018-02-19 PROCEDURE — 0502F SUBSEQUENT PRENATAL CARE: CPT | Performed by: OBSTETRICS & GYNECOLOGY

## 2018-03-15 ENCOUNTER — HOSPITAL ENCOUNTER (OUTPATIENT)
Dept: ULTRASOUND IMAGING | Age: 28
Discharge: HOME | End: 2018-03-17
Payer: COMMERCIAL

## 2018-03-15 DIAGNOSIS — Z34.92 NORMAL PREGNANCY IN SECOND TRIMESTER: ICD-10-CM

## 2018-03-15 PROCEDURE — 76805 OB US >/= 14 WKS SNGL FETUS: CPT

## 2018-03-22 ENCOUNTER — ROUTINE PRENATAL (OUTPATIENT)
Dept: OBGYN CLINIC | Age: 28
End: 2018-03-22

## 2018-03-22 VITALS
WEIGHT: 148 LBS | DIASTOLIC BLOOD PRESSURE: 58 MMHG | BODY MASS INDEX: 23.89 KG/M2 | HEART RATE: 77 BPM | SYSTOLIC BLOOD PRESSURE: 92 MMHG

## 2018-03-22 DIAGNOSIS — Z34.92 NORMAL PREGNANCY IN SECOND TRIMESTER: Primary | ICD-10-CM

## 2018-03-22 DIAGNOSIS — Z3A.20 20 WEEKS GESTATION OF PREGNANCY: ICD-10-CM

## 2018-03-22 PROCEDURE — 0502F SUBSEQUENT PRENATAL CARE: CPT | Performed by: OBSTETRICS & GYNECOLOGY

## 2018-03-26 NOTE — PROGRESS NOTES
Known Problems Sister     No Known Problems Other     Breast Cancer Neg Hx     Cancer Neg Hx     Colon Cancer Neg Hx     Diabetes Neg Hx     Eclampsia Neg Hx     Hypertension Neg Hx     Ovarian Cancer Neg Hx      Labor Neg Hx     Spont Abortions Neg Hx     Stroke Neg Hx        ROS:  Negative    PE:  Per ACOG flowsheet    I have reviewed the patient's current medications as well as medical,surgical, social, family, and obstetric/gyn history. Also discussed smoking status and counseled regarding smoking cessation if currently a smoker. Patient declines current or history of domestic violence. 1. Normal pregnancy in second trimester     2. 20 weeks gestation of pregnancy         Medications placed this encounter:  No orders of the defined types were placed in this encounter. Orders placed this encounter:  No orders of the defined types were placed in this encounter. Follow up:  Return in about 4 weeks (around 2018) for Routine OB.

## 2018-04-19 ENCOUNTER — ROUTINE PRENATAL (OUTPATIENT)
Dept: OBGYN CLINIC | Age: 28
End: 2018-04-19

## 2018-04-19 VITALS
BODY MASS INDEX: 23.89 KG/M2 | HEART RATE: 64 BPM | SYSTOLIC BLOOD PRESSURE: 96 MMHG | WEIGHT: 148 LBS | DIASTOLIC BLOOD PRESSURE: 60 MMHG

## 2018-04-19 DIAGNOSIS — Z34.92 NORMAL PREGNANCY IN SECOND TRIMESTER: Primary | ICD-10-CM

## 2018-04-19 DIAGNOSIS — Z3A.24 24 WEEKS GESTATION OF PREGNANCY: ICD-10-CM

## 2018-04-19 PROCEDURE — 0502F SUBSEQUENT PRENATAL CARE: CPT | Performed by: OBSTETRICS & GYNECOLOGY

## 2018-05-22 ENCOUNTER — ROUTINE PRENATAL (OUTPATIENT)
Dept: OBGYN CLINIC | Age: 28
End: 2018-05-22

## 2018-05-22 VITALS
WEIGHT: 154 LBS | BODY MASS INDEX: 24.86 KG/M2 | HEART RATE: 78 BPM | SYSTOLIC BLOOD PRESSURE: 102 MMHG | DIASTOLIC BLOOD PRESSURE: 64 MMHG

## 2018-05-22 DIAGNOSIS — Z34.93 NORMAL PREGNANCY IN THIRD TRIMESTER: Primary | ICD-10-CM

## 2018-05-22 DIAGNOSIS — Z34.93 NORMAL PREGNANCY IN THIRD TRIMESTER: ICD-10-CM

## 2018-05-22 DIAGNOSIS — Z3A.29 29 WEEKS GESTATION OF PREGNANCY: ICD-10-CM

## 2018-05-22 LAB
GLUCOSE, 1HR PP: 101 MG/DL (ref 60–140)
HCT VFR BLD CALC: 30.9 % (ref 37–47)
HEMOGLOBIN: 10.5 G/DL (ref 12–16)

## 2018-05-22 PROCEDURE — 0502F SUBSEQUENT PRENATAL CARE: CPT | Performed by: OBSTETRICS & GYNECOLOGY

## 2018-05-23 ENCOUNTER — TELEPHONE (OUTPATIENT)
Dept: OBGYN CLINIC | Age: 28
End: 2018-05-23

## 2018-05-23 RX ORDER — FERROUS SULFATE 325(65) MG
325 TABLET ORAL
Qty: 90 TABLET | Refills: 3 | Status: SHIPPED | OUTPATIENT
Start: 2018-05-23 | End: 2019-05-28

## 2018-06-05 ENCOUNTER — ROUTINE PRENATAL (OUTPATIENT)
Dept: OBGYN CLINIC | Age: 28
End: 2018-06-05

## 2018-06-05 VITALS — BODY MASS INDEX: 24.86 KG/M2 | SYSTOLIC BLOOD PRESSURE: 100 MMHG | DIASTOLIC BLOOD PRESSURE: 58 MMHG | WEIGHT: 154 LBS

## 2018-06-05 DIAGNOSIS — Z3A.31 31 WEEKS GESTATION OF PREGNANCY: ICD-10-CM

## 2018-06-05 DIAGNOSIS — Z34.93 NORMAL PREGNANCY IN THIRD TRIMESTER: Primary | ICD-10-CM

## 2018-06-05 PROCEDURE — 0502F SUBSEQUENT PRENATAL CARE: CPT | Performed by: OBSTETRICS & GYNECOLOGY

## 2018-06-19 ENCOUNTER — ROUTINE PRENATAL (OUTPATIENT)
Dept: OBGYN CLINIC | Age: 28
End: 2018-06-19

## 2018-06-19 VITALS — DIASTOLIC BLOOD PRESSURE: 60 MMHG | BODY MASS INDEX: 24.53 KG/M2 | WEIGHT: 152 LBS | SYSTOLIC BLOOD PRESSURE: 100 MMHG

## 2018-06-19 DIAGNOSIS — Z3A.33 33 WEEKS GESTATION OF PREGNANCY: ICD-10-CM

## 2018-06-19 DIAGNOSIS — Z34.93 NORMAL PREGNANCY IN THIRD TRIMESTER: Primary | ICD-10-CM

## 2018-06-19 PROCEDURE — 0502F SUBSEQUENT PRENATAL CARE: CPT | Performed by: OBSTETRICS & GYNECOLOGY

## 2018-07-03 ENCOUNTER — ROUTINE PRENATAL (OUTPATIENT)
Dept: OBGYN CLINIC | Age: 28
End: 2018-07-03

## 2018-07-03 VITALS
HEART RATE: 60 BPM | SYSTOLIC BLOOD PRESSURE: 102 MMHG | BODY MASS INDEX: 24.94 KG/M2 | WEIGHT: 154.5 LBS | DIASTOLIC BLOOD PRESSURE: 68 MMHG

## 2018-07-03 DIAGNOSIS — Z3A.35 35 WEEKS GESTATION OF PREGNANCY: ICD-10-CM

## 2018-07-03 DIAGNOSIS — Z34.83 ENCOUNTER FOR SUPERVISION OF OTHER NORMAL PREGNANCY IN THIRD TRIMESTER: Primary | ICD-10-CM

## 2018-07-03 PROCEDURE — 0502F SUBSEQUENT PRENATAL CARE: CPT | Performed by: OBSTETRICS & GYNECOLOGY

## 2018-07-05 NOTE — PROGRESS NOTES
No OB complaints. Good FM.  S=D. FKC bid.  + FHT. Labor precautions discussed. F/U as directed. Vitals:  /68   Pulse 60   Wt 154 lb 8 oz (70.1 kg)   LMP 10/28/2017   BMI 24.94 kg/m²   Past Medical History:   Diagnosis Date    Anemia      Past Surgical History:   Procedure Laterality Date    ANTERIOR CRUCIATE LIGAMENT REPAIR       Allergies:  Patient has no known allergies. Current Outpatient Prescriptions   Medication Sig Dispense Refill    ferrous sulfate (VILMA-MÓNICA) 325 (65 Fe) MG tablet Take 1 tablet by mouth 3 times daily (with meals) 90 tablet 3    azithromycin (ZITHROMAX) 250 MG tablet Take 1 tablet by mouth See Admin Instructions 1 packet 0    Dsijdx-HrAxo-Wdbnf-FA-DHA w/oA (VITAPEARL) 30-1.4-200 MG CPCR Lot [id number] Exp. 10/2018 Samples given to patient 6 capsule 0    Zinc Oxide (DR SMITHS DIAPER) 10 % OINT Lot [id number] Exp none - Samples given to patient 0.5 gm 1 Tube 0    Misc. Devices (BREAST PUMP) MISC 1 kit by Does not apply route as needed (pumping breast milk) 1 each 0    ferrous sulfate (VILMA-MÓNICA) 325 (65 FE) MG tablet Take 1 tablet by mouth 3 times daily (with meals) 90 tablet 3    Prenatal Vit-Fe Fumarate-FA (PRENATAL VITAMIN PO) Take by mouth       No current facility-administered medications for this visit. Social History     Social History    Marital status:      Spouse name: N/A    Number of children: N/A    Years of education: N/A     Occupational History    Not on file.      Social History Main Topics    Smoking status: Never Smoker    Smokeless tobacco: Never Used    Alcohol use No    Drug use: No    Sexual activity: Yes     Partners: Male     Other Topics Concern    Not on file     Social History Narrative    No narrative on file     Family History   Problem Relation Age of Onset    No Known Problems Paternal Grandfather     No Known Problems Paternal Grandmother     No Known Problems Maternal Grandmother     No Known Problems Maternal Grandfather     No Known Problems Father     No Known Problems Mother     No Known Problems Brother     No Known Problems Sister     No Known Problems Other     Breast Cancer Neg Hx     Cancer Neg Hx     Colon Cancer Neg Hx     Diabetes Neg Hx     Eclampsia Neg Hx     Hypertension Neg Hx     Ovarian Cancer Neg Hx      Labor Neg Hx     Spont Abortions Neg Hx     Stroke Neg Hx        ROS:  Negative    PE:  Per ACOG flowsheet    I have reviewed the patient's current medications as well as medical,surgical, social, family, and obstetric/gyn history. Also discussed smoking status and counseled regarding smoking cessation if currently a smoker. Patient declines current or history of domestic violence. Healthy diet and exercise encouraged. STD prevention discussed as appropriate. SBE encouraged monthly. Diagnosis Orders   1. Encounter for supervision of other normal pregnancy in third trimester     2. 35 weeks gestation of pregnancy         Medications placed this encounter:  No orders of the defined types were placed in this encounter. Orders placed this encounter:  No orders of the defined types were placed in this encounter. Follow up:  Return in about 1 week (around 7/10/2018) for Routine OB, Ultrasound, Results Review.

## 2018-07-12 ENCOUNTER — ROUTINE PRENATAL (OUTPATIENT)
Dept: OBGYN CLINIC | Age: 28
End: 2018-07-12

## 2018-07-12 VITALS
BODY MASS INDEX: 25.34 KG/M2 | SYSTOLIC BLOOD PRESSURE: 102 MMHG | HEART RATE: 64 BPM | DIASTOLIC BLOOD PRESSURE: 72 MMHG | WEIGHT: 157 LBS

## 2018-07-12 DIAGNOSIS — Z34.83 ENCOUNTER FOR SUPERVISION OF OTHER NORMAL PREGNANCY IN THIRD TRIMESTER: ICD-10-CM

## 2018-07-12 DIAGNOSIS — Z3A.36 36 WEEKS GESTATION OF PREGNANCY: Primary | ICD-10-CM

## 2018-07-12 DIAGNOSIS — Z3A.36 36 WEEKS GESTATION OF PREGNANCY: ICD-10-CM

## 2018-07-12 PROCEDURE — 0502F SUBSEQUENT PRENATAL CARE: CPT | Performed by: NURSE PRACTITIONER

## 2018-07-15 LAB — GROUP B STREP CULTURE: NORMAL

## 2018-07-19 ENCOUNTER — ROUTINE PRENATAL (OUTPATIENT)
Dept: OBGYN CLINIC | Age: 28
End: 2018-07-19

## 2018-07-19 VITALS
WEIGHT: 156 LBS | SYSTOLIC BLOOD PRESSURE: 98 MMHG | BODY MASS INDEX: 25.18 KG/M2 | HEART RATE: 67 BPM | DIASTOLIC BLOOD PRESSURE: 62 MMHG

## 2018-07-19 DIAGNOSIS — Z34.83 ENCOUNTER FOR SUPERVISION OF OTHER NORMAL PREGNANCY IN THIRD TRIMESTER: ICD-10-CM

## 2018-07-19 DIAGNOSIS — Z3A.37 37 WEEKS GESTATION OF PREGNANCY: Primary | ICD-10-CM

## 2018-07-19 PROCEDURE — G8419 CALC BMI OUT NRM PARAM NOF/U: HCPCS | Performed by: NURSE PRACTITIONER

## 2018-07-19 PROCEDURE — 1036F TOBACCO NON-USER: CPT | Performed by: NURSE PRACTITIONER

## 2018-07-19 PROCEDURE — 0502F SUBSEQUENT PRENATAL CARE: CPT | Performed by: NURSE PRACTITIONER

## 2018-07-19 PROCEDURE — G8427 DOCREV CUR MEDS BY ELIG CLIN: HCPCS | Performed by: NURSE PRACTITIONER

## 2018-07-19 NOTE — PATIENT INSTRUCTIONS
problems. It's also a good idea to know your test results and keep a list of the medicines you take. How can you care for yourself at home? · Get plenty of rest.  · Take childbirth classes with your partner or . You will learn relaxation exercises that are helpful during labor. You also will learn what you can do to manage labor pain. · If you have other children, take a class to learn how to help them adjust to the new baby. · Develop a written birth plan, if you choose to, keeping in mind that labor is hard to predict and your plan may change after labor begins. Some of what a birth plan may address includes:  ¨ Where you would like to have your baby. This includes the building and the room. It could be the hospital's birthing room, a separate birthing center, or your own home. ¨ Who you would like to assist with delivery of your baby. You may want your doctor, an obstetrician, or a certified nurse-midwife. Some women prefer a lay midwife or a  to provide support before and after delivery. ¨ Whether you want a , nurse, midwife, or  to give you support from early labor until after childbirth. ¨ What comfort measures you want. You may have to choose between walking around during labor or having the security of a heart monitor for your baby. You may want to listen to music during labor. You may know what position you want to be in (such as sitting, squatting, or reclining) for pushing. ¨ What pain relief you would like. There are several choices, so be sure to talk with your doctor about them. ¨ How you want you and your baby to spend the first few hours after birth. ¨ You may want to keep your baby with you for at least 1 hour after birth for bonding and early breastfeeding. ¨ You may want the hospital to delay some infant care steps, such as a vitamin K injection, so that you have calming time with your baby.   ¨ You may not want visitors, or you may want other family

## 2018-07-25 ENCOUNTER — HOSPITAL ENCOUNTER (OUTPATIENT)
Dept: ULTRASOUND IMAGING | Age: 28
Discharge: HOME | End: 2018-07-27
Payer: COMMERCIAL

## 2018-07-25 DIAGNOSIS — Z34.93 NORMAL PREGNANCY IN THIRD TRIMESTER: Primary | ICD-10-CM

## 2018-07-25 DIAGNOSIS — Z34.93 NORMAL PREGNANCY IN THIRD TRIMESTER: ICD-10-CM

## 2018-07-25 PROCEDURE — 76815 OB US LIMITED FETUS(S): CPT

## 2018-07-26 ENCOUNTER — ROUTINE PRENATAL (OUTPATIENT)
Dept: OBGYN CLINIC | Age: 28
End: 2018-07-26

## 2018-07-26 VITALS — WEIGHT: 157 LBS | DIASTOLIC BLOOD PRESSURE: 68 MMHG | SYSTOLIC BLOOD PRESSURE: 102 MMHG | BODY MASS INDEX: 25.34 KG/M2

## 2018-07-26 DIAGNOSIS — Z34.93 NORMAL PREGNANCY IN THIRD TRIMESTER: Primary | ICD-10-CM

## 2018-07-26 DIAGNOSIS — Z3A.38 38 WEEKS GESTATION OF PREGNANCY: ICD-10-CM

## 2018-07-26 PROCEDURE — 0502F SUBSEQUENT PRENATAL CARE: CPT | Performed by: OBSTETRICS & GYNECOLOGY

## 2018-07-26 RX ORDER — BREAST PUMP
1 EACH MISCELLANEOUS PRN
Qty: 1 EACH | Refills: 0 | Status: SHIPPED | OUTPATIENT
Start: 2018-07-26 | End: 2018-07-30 | Stop reason: SDUPTHER

## 2018-07-26 NOTE — PROGRESS NOTES
No OB complaints. Good FM.  S=D. FKC bid.  + FHT. Labor precautions discussed. US WNL. Discussed and planned induction for next week ( cervidil as needed ). R/B discussed. F/U as directed. Vitals:  /68   Wt 157 lb (71.2 kg)   LMP 10/28/2017   BMI 25.34 kg/m²   Past Medical History:   Diagnosis Date    Anemia      Past Surgical History:   Procedure Laterality Date    ANTERIOR CRUCIATE LIGAMENT REPAIR       Allergies:  Patient has no known allergies. Current Outpatient Prescriptions   Medication Sig Dispense Refill    Misc. Devices (BREAST PUMP) MISC 1 kit by Does not apply route as needed (pumping breast milk) 1 each 0    ferrous sulfate (VILMA-MÓNICA) 325 (65 Fe) MG tablet Take 1 tablet by mouth 3 times daily (with meals) 90 tablet 3    Lbkdie-HtYgl-Dojtn-FA-DHA w/oA (VITAPEARL) 30-1.4-200 MG CPCR Lot [id number] Exp. 10/2018 Samples given to patient 6 capsule 0    Zinc Oxide (DR SMITHS DIAPER) 10 % OINT Lot [id number] Exp none - Samples given to patient 0.5 gm 1 Tube 0    ferrous sulfate (VILMA-MÓNICA) 325 (65 FE) MG tablet Take 1 tablet by mouth 3 times daily (with meals) 90 tablet 3    Prenatal Vit-Fe Fumarate-FA (PRENATAL VITAMIN PO) Take by mouth       No current facility-administered medications for this visit. Social History     Social History    Marital status:      Spouse name: N/A    Number of children: N/A    Years of education: N/A     Occupational History    Not on file.      Social History Main Topics    Smoking status: Never Smoker    Smokeless tobacco: Never Used    Alcohol use No    Drug use: No    Sexual activity: Yes     Partners: Male     Other Topics Concern    Not on file     Social History Narrative    No narrative on file     Family History   Problem Relation Age of Onset    No Known Problems Paternal Grandfather     No Known Problems Paternal Grandmother     No Known Problems Maternal Grandmother     No Known Problems Maternal Grandfather     No

## 2018-07-30 ENCOUNTER — TELEPHONE (OUTPATIENT)
Dept: OBGYN CLINIC | Age: 28
End: 2018-07-30

## 2018-07-30 ENCOUNTER — ROUTINE PRENATAL (OUTPATIENT)
Dept: OBGYN CLINIC | Age: 28
End: 2018-07-30
Payer: COMMERCIAL

## 2018-07-30 VITALS
BODY MASS INDEX: 25.5 KG/M2 | DIASTOLIC BLOOD PRESSURE: 72 MMHG | HEART RATE: 72 BPM | SYSTOLIC BLOOD PRESSURE: 104 MMHG | WEIGHT: 158 LBS

## 2018-07-30 DIAGNOSIS — Z34.83 ENCOUNTER FOR SUPERVISION OF OTHER NORMAL PREGNANCY IN THIRD TRIMESTER: Primary | ICD-10-CM

## 2018-07-30 DIAGNOSIS — Z3A.39 39 WEEKS GESTATION OF PREGNANCY: ICD-10-CM

## 2018-07-30 PROCEDURE — 59025 FETAL NON-STRESS TEST: CPT | Performed by: OBSTETRICS & GYNECOLOGY

## 2018-07-30 PROCEDURE — 0502F SUBSEQUENT PRENATAL CARE: CPT | Performed by: OBSTETRICS & GYNECOLOGY

## 2018-07-30 RX ORDER — BREAST PUMP
1 EACH MISCELLANEOUS PRN
Qty: 1 EACH | Refills: 0 | Status: SHIPPED | OUTPATIENT
Start: 2018-07-30 | End: 2019-05-28

## 2018-07-30 NOTE — PROGRESS NOTES
Brother     No Known Problems Sister     No Known Problems Other     Breast Cancer Neg Hx     Cancer Neg Hx     Colon Cancer Neg Hx     Diabetes Neg Hx     Eclampsia Neg Hx     Hypertension Neg Hx     Ovarian Cancer Neg Hx      Labor Neg Hx     Spont Abortions Neg Hx     Stroke Neg Hx        ROS:  Negative    PE:  Per ACOG flowsheet    I have reviewed the patient's current medications as well as medical,surgical, social, family, and obstetric/gyn history. Also discussed smoking status and counseled regarding smoking cessation if currently a smoker. Patient declines current or history of domestic violence. Diagnosis Orders   1. Encounter for supervision of other normal pregnancy in third trimester     2. 39 weeks gestation of pregnancy         Medications placed this encounter:  No orders of the defined types were placed in this encounter. Orders placed this encounter:  No orders of the defined types were placed in this encounter. Follow up:  Routine PP visit.

## 2018-07-30 NOTE — TELEPHONE ENCOUNTER
Patient asking for a script for a Medela Free Style breast pump to be sent to [address] [place] [address] per her insurance. Previous script to [facility] denied by insurance. Pharmacy updated.

## 2018-08-01 ENCOUNTER — HOSPITAL ENCOUNTER (INPATIENT)
Age: 28
LOS: 2 days | Discharge: HOME | End: 2018-08-03
Attending: OBSTETRICS & GYNECOLOGY | Admitting: OBSTETRICS & GYNECOLOGY
Payer: COMMERCIAL

## 2018-08-01 DIAGNOSIS — R52 PAIN: Primary | ICD-10-CM

## 2018-08-01 LAB
ABO/RH: NORMAL
ALBUMIN SERPL-MCNC: 3.5 G/DL (ref 3.9–4.9)
ALP BLD-CCNC: 157 U/L (ref 40–130)
ALT SERPL-CCNC: 7 U/L (ref 0–33)
AMPHETAMINE SCREEN, URINE: NORMAL
ANION GAP SERPL CALCULATED.3IONS-SCNC: 14 MEQ/L (ref 7–13)
ANTIBODY SCREEN: NORMAL
APTT: 23.1 SEC (ref 21.6–35.4)
AST SERPL-CCNC: 14 U/L (ref 0–35)
BARBITURATE SCREEN URINE: NORMAL
BASOPHILS ABSOLUTE: 0.1 K/UL (ref 0–0.2)
BASOPHILS RELATIVE PERCENT: 0.5 %
BENZODIAZEPINE SCREEN, URINE: NORMAL
BILIRUB SERPL-MCNC: 0.4 MG/DL (ref 0–1.2)
BILIRUBIN URINE: NEGATIVE
BLOOD, URINE: NEGATIVE
BUN BLDV-MCNC: 12 MG/DL (ref 6–20)
CALCIUM SERPL-MCNC: 8.7 MG/DL (ref 8.6–10.2)
CANNABINOID SCREEN URINE: NORMAL
CHLORIDE BLD-SCNC: 99 MEQ/L (ref 98–107)
CLARITY: CLEAR
CO2: 20 MEQ/L (ref 22–29)
COCAINE METABOLITE SCREEN URINE: NORMAL
COLOR: YELLOW
CREAT SERPL-MCNC: 0.69 MG/DL (ref 0.5–0.9)
EOSINOPHILS ABSOLUTE: 0 K/UL (ref 0–0.7)
EOSINOPHILS RELATIVE PERCENT: 0.4 %
EPITHELIAL CELLS, UA: NORMAL /HPF
FIBRINOGEN: 406 MG/DL (ref 200–400)
GFR AFRICAN AMERICAN: >60
GFR NON-AFRICAN AMERICAN: >60
GLOBULIN: 3.1 G/DL (ref 2.3–3.5)
GLUCOSE BLD-MCNC: 74 MG/DL (ref 74–109)
GLUCOSE URINE: NEGATIVE MG/DL
HCT VFR BLD CALC: 33.1 % (ref 37–47)
HEMOGLOBIN: 11.7 G/DL (ref 12–16)
HEPATITIS B SURFACE ANTIGEN INTERPRETATION: NORMAL
INR BLD: 0.9
KETONES, URINE: NEGATIVE MG/DL
LEUKOCYTE ESTERASE, URINE: ABNORMAL
LYMPHOCYTES ABSOLUTE: 1.9 K/UL (ref 1–4.8)
LYMPHOCYTES RELATIVE PERCENT: 18.8 %
Lab: NORMAL
MCH RBC QN AUTO: 33.2 PG (ref 27–31.3)
MCHC RBC AUTO-ENTMCNC: 35.5 % (ref 33–37)
MCV RBC AUTO: 93.7 FL (ref 82–100)
MONOCYTES ABSOLUTE: 0.9 K/UL (ref 0.2–0.8)
MONOCYTES RELATIVE PERCENT: 8.9 %
NEUTROPHILS ABSOLUTE: 7.4 K/UL (ref 1.4–6.5)
NEUTROPHILS RELATIVE PERCENT: 71.4 %
NITRITE, URINE: NEGATIVE
OPIATE SCREEN URINE: NORMAL
PDW BLD-RTO: 14.1 % (ref 11.5–14.5)
PH UA: 6 (ref 5–9)
PHENCYCLIDINE SCREEN URINE: NORMAL
PLATELET # BLD: 246 K/UL (ref 130–400)
POTASSIUM SERPL-SCNC: 3.7 MEQ/L (ref 3.5–5.1)
PROTEIN UA: NEGATIVE MG/DL
PROTHROMBIN TIME: 9.6 SEC (ref 9.6–12.3)
RBC # BLD: 3.53 M/UL (ref 4.2–5.4)
RBC UA: NORMAL /HPF (ref 0–2)
RUBELLA ANTIBODY IGG: 89.3 IU/ML
SODIUM BLD-SCNC: 133 MEQ/L (ref 132–144)
SPECIFIC GRAVITY UA: 1.01 (ref 1–1.03)
TOTAL PROTEIN: 6.6 G/DL (ref 6.4–8.1)
URIC ACID, SERUM: 4.9 MG/DL (ref 2.4–5.7)
UROBILINOGEN, URINE: 0.2 E.U./DL
WBC # BLD: 10.3 K/UL (ref 4.8–10.8)
WBC UA: NORMAL /HPF (ref 0–5)

## 2018-08-01 PROCEDURE — 86762 RUBELLA ANTIBODY: CPT

## 2018-08-01 PROCEDURE — 36415 COLL VENOUS BLD VENIPUNCTURE: CPT

## 2018-08-01 PROCEDURE — 80307 DRUG TEST PRSMV CHEM ANLYZR: CPT

## 2018-08-01 PROCEDURE — 86592 SYPHILIS TEST NON-TREP QUAL: CPT

## 2018-08-01 PROCEDURE — 84550 ASSAY OF BLOOD/URIC ACID: CPT

## 2018-08-01 PROCEDURE — 85730 THROMBOPLASTIN TIME PARTIAL: CPT

## 2018-08-01 PROCEDURE — 80053 COMPREHEN METABOLIC PANEL: CPT

## 2018-08-01 PROCEDURE — 85384 FIBRINOGEN ACTIVITY: CPT

## 2018-08-01 PROCEDURE — 86901 BLOOD TYPING SEROLOGIC RH(D): CPT

## 2018-08-01 PROCEDURE — 85610 PROTHROMBIN TIME: CPT

## 2018-08-01 PROCEDURE — 85025 COMPLETE CBC W/AUTO DIFF WBC: CPT

## 2018-08-01 PROCEDURE — 87340 HEPATITIS B SURFACE AG IA: CPT

## 2018-08-01 PROCEDURE — 86900 BLOOD TYPING SEROLOGIC ABO: CPT

## 2018-08-01 PROCEDURE — 1220000000 HC SEMI PRIVATE OB R&B

## 2018-08-01 PROCEDURE — 6370000000 HC RX 637 (ALT 250 FOR IP): Performed by: OBSTETRICS & GYNECOLOGY

## 2018-08-01 PROCEDURE — 81001 URINALYSIS AUTO W/SCOPE: CPT

## 2018-08-01 PROCEDURE — 59200 INSERT CERVICAL DILATOR: CPT | Performed by: OBSTETRICS & GYNECOLOGY

## 2018-08-01 PROCEDURE — 86850 RBC ANTIBODY SCREEN: CPT

## 2018-08-01 RX ORDER — SODIUM CHLORIDE, SODIUM LACTATE, POTASSIUM CHLORIDE, CALCIUM CHLORIDE 600; 310; 30; 20 MG/100ML; MG/100ML; MG/100ML; MG/100ML
INJECTION, SOLUTION INTRAVENOUS CONTINUOUS
Status: DISCONTINUED | OUTPATIENT
Start: 2018-08-01 | End: 2018-08-03 | Stop reason: HOSPADM

## 2018-08-01 RX ORDER — DIPHENHYDRAMINE HYDROCHLORIDE 50 MG/ML
25 INJECTION INTRAMUSCULAR; INTRAVENOUS EVERY 4 HOURS PRN
Status: DISCONTINUED | OUTPATIENT
Start: 2018-08-01 | End: 2018-08-03 | Stop reason: HOSPADM

## 2018-08-01 RX ORDER — ZOLPIDEM TARTRATE 5 MG/1
10 TABLET ORAL ONCE
Status: COMPLETED | OUTPATIENT
Start: 2018-08-01 | End: 2018-08-01

## 2018-08-01 RX ORDER — NALBUPHINE HCL 10 MG/ML
10 AMPUL (ML) INJECTION
Status: DISCONTINUED | OUTPATIENT
Start: 2018-08-01 | End: 2018-08-03 | Stop reason: HOSPADM

## 2018-08-01 RX ORDER — IBUPROFEN 800 MG/1
800 TABLET ORAL EVERY 8 HOURS SCHEDULED
Status: DISCONTINUED | OUTPATIENT
Start: 2018-08-01 | End: 2018-08-03 | Stop reason: HOSPADM

## 2018-08-01 RX ORDER — LIDOCAINE HYDROCHLORIDE 10 MG/ML
30 INJECTION, SOLUTION EPIDURAL; INFILTRATION; INTRACAUDAL; PERINEURAL PRN
Status: DISCONTINUED | OUTPATIENT
Start: 2018-08-01 | End: 2018-08-03 | Stop reason: HOSPADM

## 2018-08-01 RX ORDER — SIMETHICONE 80 MG
80 TABLET,CHEWABLE ORAL EVERY 6 HOURS PRN
Status: DISCONTINUED | OUTPATIENT
Start: 2018-08-01 | End: 2018-08-03 | Stop reason: HOSPADM

## 2018-08-01 RX ORDER — CARBOPROST TROMETHAMINE 250 UG/ML
250 INJECTION, SOLUTION INTRAMUSCULAR PRN
Status: DISCONTINUED | OUTPATIENT
Start: 2018-08-01 | End: 2018-08-03 | Stop reason: HOSPADM

## 2018-08-01 RX ORDER — SODIUM CHLORIDE 0.9 % (FLUSH) 0.9 %
10 SYRINGE (ML) INJECTION EVERY 12 HOURS SCHEDULED
Status: DISCONTINUED | OUTPATIENT
Start: 2018-08-01 | End: 2018-08-03 | Stop reason: HOSPADM

## 2018-08-01 RX ORDER — ZOLPIDEM TARTRATE 5 MG/1
10 TABLET ORAL NIGHTLY PRN
Status: DISCONTINUED | OUTPATIENT
Start: 2018-08-02 | End: 2018-08-01

## 2018-08-01 RX ORDER — DOCUSATE SODIUM 100 MG/1
100 CAPSULE, LIQUID FILLED ORAL 2 TIMES DAILY
Status: DISCONTINUED | OUTPATIENT
Start: 2018-08-01 | End: 2018-08-03 | Stop reason: HOSPADM

## 2018-08-01 RX ORDER — SODIUM CHLORIDE 0.9 % (FLUSH) 0.9 %
10 SYRINGE (ML) INJECTION PRN
Status: DISCONTINUED | OUTPATIENT
Start: 2018-08-01 | End: 2018-08-03 | Stop reason: HOSPADM

## 2018-08-01 RX ORDER — ONDANSETRON 2 MG/ML
4 INJECTION INTRAMUSCULAR; INTRAVENOUS EVERY 6 HOURS PRN
Status: DISCONTINUED | OUTPATIENT
Start: 2018-08-01 | End: 2018-08-03 | Stop reason: HOSPADM

## 2018-08-01 RX ORDER — BISACODYL 10 MG
10 SUPPOSITORY, RECTAL RECTAL DAILY PRN
Status: DISCONTINUED | OUTPATIENT
Start: 2018-08-01 | End: 2018-08-03 | Stop reason: HOSPADM

## 2018-08-01 RX ORDER — TERBUTALINE SULFATE 1 MG/ML
0.25 INJECTION, SOLUTION SUBCUTANEOUS ONCE
Status: DISCONTINUED | OUTPATIENT
Start: 2018-08-01 | End: 2018-08-03 | Stop reason: HOSPADM

## 2018-08-01 RX ORDER — MISOPROSTOL 200 UG/1
800 TABLET ORAL PRN
Status: DISCONTINUED | OUTPATIENT
Start: 2018-08-01 | End: 2018-08-03 | Stop reason: HOSPADM

## 2018-08-01 RX ORDER — METHYLERGONOVINE MALEATE 0.2 MG/ML
200 INJECTION INTRAVENOUS PRN
Status: DISCONTINUED | OUTPATIENT
Start: 2018-08-01 | End: 2018-08-03 | Stop reason: HOSPADM

## 2018-08-01 RX ADMIN — ZOLPIDEM TARTRATE 10 MG: 5 TABLET ORAL at 23:29

## 2018-08-01 RX ADMIN — DINOPROSTONE 10 MG: 10 INSERT VAGINAL at 21:47

## 2018-08-02 ENCOUNTER — ANESTHESIA (OUTPATIENT)
Dept: LABOR AND DELIVERY | Age: 28
End: 2018-08-02
Payer: COMMERCIAL

## 2018-08-02 ENCOUNTER — ANESTHESIA EVENT (OUTPATIENT)
Dept: LABOR AND DELIVERY | Age: 28
End: 2018-08-02
Payer: COMMERCIAL

## 2018-08-02 PROBLEM — Z37.9 NORMAL LABOR: Status: ACTIVE | Noted: 2018-08-02

## 2018-08-02 PROCEDURE — 1220000000 HC SEMI PRIVATE OB R&B

## 2018-08-02 PROCEDURE — 4A1HXCZ MONITORING OF PRODUCTS OF CONCEPTION, CARDIAC RATE, EXTERNAL APPROACH: ICD-10-PCS | Performed by: OBSTETRICS & GYNECOLOGY

## 2018-08-02 PROCEDURE — 6360000002 HC RX W HCPCS: Performed by: OBSTETRICS & GYNECOLOGY

## 2018-08-02 PROCEDURE — 59400 OBSTETRICAL CARE: CPT | Performed by: OBSTETRICS & GYNECOLOGY

## 2018-08-02 PROCEDURE — 10907ZC DRAINAGE OF AMNIOTIC FLUID, THERAPEUTIC FROM PRODUCTS OF CONCEPTION, VIA NATURAL OR ARTIFICIAL OPENING: ICD-10-PCS | Performed by: OBSTETRICS & GYNECOLOGY

## 2018-08-02 PROCEDURE — 6370000000 HC RX 637 (ALT 250 FOR IP): Performed by: OBSTETRICS & GYNECOLOGY

## 2018-08-02 PROCEDURE — 2500000003 HC RX 250 WO HCPCS: Performed by: OBSTETRICS & GYNECOLOGY

## 2018-08-02 PROCEDURE — 2580000003 HC RX 258: Performed by: OBSTETRICS & GYNECOLOGY

## 2018-08-02 PROCEDURE — 3E0P7VZ INTRODUCTION OF HORMONE INTO FEMALE REPRODUCTIVE, VIA NATURAL OR ARTIFICIAL OPENING: ICD-10-PCS | Performed by: OBSTETRICS & GYNECOLOGY

## 2018-08-02 PROCEDURE — 3700000025 ANESTHESIA EPIDURAL BLOCK: Performed by: NURSE ANESTHETIST, CERTIFIED REGISTERED

## 2018-08-02 RX ORDER — NALOXONE HYDROCHLORIDE 0.4 MG/ML
0.4 INJECTION, SOLUTION INTRAMUSCULAR; INTRAVENOUS; SUBCUTANEOUS PRN
Status: DISCONTINUED | OUTPATIENT
Start: 2018-08-02 | End: 2018-08-03 | Stop reason: HOSPADM

## 2018-08-02 RX ORDER — NALBUPHINE HCL 10 MG/ML
5 AMPUL (ML) INJECTION EVERY 4 HOURS PRN
Status: DISCONTINUED | OUTPATIENT
Start: 2018-08-02 | End: 2018-08-03 | Stop reason: HOSPADM

## 2018-08-02 RX ORDER — ONDANSETRON 2 MG/ML
4 INJECTION INTRAMUSCULAR; INTRAVENOUS EVERY 6 HOURS PRN
Status: DISCONTINUED | OUTPATIENT
Start: 2018-08-02 | End: 2018-08-03 | Stop reason: HOSPADM

## 2018-08-02 RX ADMIN — Medication 10 ML: at 15:02

## 2018-08-02 RX ADMIN — SODIUM CHLORIDE, POTASSIUM CHLORIDE, SODIUM LACTATE AND CALCIUM CHLORIDE: 600; 310; 30; 20 INJECTION, SOLUTION INTRAVENOUS at 09:08

## 2018-08-02 RX ADMIN — SODIUM CHLORIDE, POTASSIUM CHLORIDE, SODIUM LACTATE AND CALCIUM CHLORIDE: 600; 310; 30; 20 INJECTION, SOLUTION INTRAVENOUS at 07:56

## 2018-08-02 RX ADMIN — Medication 12 ML/HR: at 09:06

## 2018-08-02 RX ADMIN — IBUPROFEN 800 MG: 800 TABLET, FILM COATED ORAL at 17:40

## 2018-08-02 RX ADMIN — Medication 10 ML: at 07:55

## 2018-08-02 RX ADMIN — Medication 1 MILLI-UNITS/MIN: at 08:03

## 2018-08-02 ASSESSMENT — PAIN SCALES - GENERAL
PAINLEVEL_OUTOF10: 2
PAINLEVEL_OUTOF10: 0

## 2018-08-02 NOTE — LACTATION NOTE
In to visit pt  Pt states that she pumped her breast milk for a month and a half  Pt will be returning to work earlier after this pregnancy  Reviewed breastfeeding concepts with pt  Pt has a breast pump at home  Amparo Steen

## 2018-08-02 NOTE — PROGRESS NOTES
Cervidil placed  Cx: 2/50/-3  FHR: 140's, moderate variability, +accels, -decels  Ctx: irregular  H/o TSVD x 1  Inductio for favorable cx at term    Reyna Rosenthal MD

## 2018-08-02 NOTE — FLOWSHEET NOTE
Spoke with Aftab Dupree CRNA about pt desire for epidural and update on progress with ROM and pitocin running    IV fluids to run at 999cc/hr in prep for same

## 2018-08-02 NOTE — ANESTHESIA POSTPROCEDURE EVALUATION
Department of Anesthesiology  Postprocedure Note    Patient: Ling Lopes  MRN: [id number]  YOB: 1990  Date of evaluation: 8/2/2018  Time:  12:31 PM     Procedure Summary     Date:  08/02/18 Room / Location:      Anesthesia Start:  4072 Anesthesia Stop:  1139    Procedure:  ANESTHESIA LABOR ANALGESIA Diagnosis:      Scheduled Providers:   Responsible Provider:  CORA Guthrie CRNA    Anesthesia Type:  epidural ASA Status:  2          Anesthesia Type: epidural    Emilia Phase I:      Emilia Phase II:      Last vitals: Reviewed and per EMR flowsheets.        Anesthesia Post Evaluation    Patient location during evaluation: bedside  Patient participation: complete - patient participated  Level of consciousness: awake  Airway patency: patent  Nausea & Vomiting: no nausea and no vomiting  Complications: no  Cardiovascular status: hemodynamically stable  Respiratory status: acceptable  Hydration status: euvolemic

## 2018-08-03 VITALS
HEART RATE: 68 BPM | TEMPERATURE: 98.1 F | BODY MASS INDEX: 25.07 KG/M2 | WEIGHT: 156 LBS | RESPIRATION RATE: 20 BRPM | HEIGHT: 66 IN | OXYGEN SATURATION: 98 % | SYSTOLIC BLOOD PRESSURE: 108 MMHG | DIASTOLIC BLOOD PRESSURE: 61 MMHG

## 2018-08-03 LAB — RPR: NORMAL

## 2018-08-03 PROCEDURE — 7200000001 HC VAGINAL DELIVERY

## 2018-08-03 PROCEDURE — 99024 POSTOP FOLLOW-UP VISIT: CPT | Performed by: OBSTETRICS & GYNECOLOGY

## 2018-08-03 PROCEDURE — 6370000000 HC RX 637 (ALT 250 FOR IP): Performed by: OBSTETRICS & GYNECOLOGY

## 2018-08-03 RX ORDER — IBUPROFEN 800 MG/1
800 TABLET ORAL EVERY 8 HOURS PRN
Qty: 120 TABLET | Refills: 3 | Status: SHIPPED | OUTPATIENT
Start: 2018-08-03 | End: 2019-05-28

## 2018-08-03 RX ORDER — HYDROCODONE BITARTRATE AND ACETAMINOPHEN 5; 325 MG/1; MG/1
1 TABLET ORAL EVERY 6 HOURS PRN
Qty: 10 TABLET | Refills: 0 | Status: SHIPPED | OUTPATIENT
Start: 2018-08-03 | End: 2018-08-06

## 2018-08-03 RX ADMIN — IBUPROFEN 800 MG: 800 TABLET, FILM COATED ORAL at 03:02

## 2018-08-03 RX ADMIN — IBUPROFEN 800 MG: 800 TABLET, FILM COATED ORAL at 15:56

## 2018-08-03 RX ADMIN — BENZOCAINE AND LEVOMENTHOL: 200; 5 SPRAY TOPICAL at 09:49

## 2018-08-03 RX ADMIN — DOCUSATE SODIUM 100 MG: 100 CAPSULE, LIQUID FILLED ORAL at 09:49

## 2018-08-03 ASSESSMENT — ENCOUNTER SYMPTOMS
ABDOMINAL DISTENTION: 0
RESPIRATORY NEGATIVE: 1
EYES NEGATIVE: 1
RECTAL PAIN: 0
BLOOD IN STOOL: 0
ABDOMINAL PAIN: 0
NAUSEA: 0
DIARRHEA: 0
VOMITING: 0
CONSTIPATION: 0
ANAL BLEEDING: 0
ALLERGIC/IMMUNOLOGIC NEGATIVE: 1

## 2018-08-03 ASSESSMENT — PAIN SCALES - GENERAL
PAINLEVEL_OUTOF10: 2
PAINLEVEL_OUTOF10: 4

## 2018-08-03 NOTE — DISCHARGE SUMMARY
PPD # 1 s/p . No complaints. Normal lochia. Feels well and requesting discharge later today. Vitals:  /76   Pulse 67   Temp 97.7 °F (36.5 °C) (Oral)   Resp 18   Ht 5' 6\" (1.676 m)   Wt 156 lb (70.8 kg)   LMP 10/28/2017   SpO2 98%   Breastfeeding? Unknown   BMI 25.18 kg/m²   Past Medical History:   Diagnosis Date    Anemia      Past Surgical History:   Procedure Laterality Date    ANTERIOR CRUCIATE LIGAMENT REPAIR       Allergies:  Patient has no known allergies.   Current Facility-Administered Medications   Medication Dose Route Frequency Provider Last Rate Last Dose    naloxone (NARCAN) injection 0.4 mg  0.4 mg Intravenous PRN Rocío Carson MD        nalbuphine (NUBAIN) injection 5 mg  5 mg Intravenous Q4H PRN Rocío Carson MD        ondansetron [facility]) injection 4 mg  4 mg Intravenous Q6H PRN Rocío Carson MD        fentaNYL 125 mcg, ropivacaine 0.2% in sodium chloride 0.9% 127.5ml (OB) epidural  12 mL/hr Epidural Continuous Rocío Carson MD 12 mL/hr at 18 0906 12 mL/hr at 18 3192    lactated ringers infusion   Intravenous Continuous Rocío Carson MD   Stopped at 18 1510    sodium chloride flush 0.9 % injection 10 mL  10 mL Intravenous 2 times per day Rocío Carson MD        sodium chloride flush 0.9 % injection 10 mL  10 mL Intravenous PRN Rocío Carson MD   10 mL at 18 1502    lidocaine PF 1 % injection 30 mL  30 mL Other PRN Rocío Carson MD        nalbuphine (NUBAIN) injection 10 mg  10 mg Intravenous Q2H PRN Rocío Carson MD        ibuprofen (ADVIL;MOTRIN) tablet 800 mg  800 mg Oral 3 times per day Rocío Carson MD   800 mg at 18 0302    oxytocin (PITOCIN) 30 units in 500 mL infusion  1 charles-units/min Intravenous Continuous Rocío Carson MD   Stopped at 18 1034    diphenhydrAMINE (BENADRYL) injection 25 mg  25 mg Intravenous Q4H PRN Rocío Carson MD        [facility]) chewable tablet 80 mg  80 mg Oral Q6H PRN Rocío Carson MD        docusate sodium (COLACE) capsule 100 mg  100 mg Oral BID Rocío Carson MD        bisacodyl (DULCOLAX) suppository 10 mg  10 mg Rectal Daily PRN Rocío Carson MD        ondansetron [facility]) injection 4 mg  4 mg Intravenous Q6H PRN Rocío Carson MD        terbutaline (BRETHINE) injection 0.25 mg  0.25 mg Subcutaneous Once Rocío Carson MD   Stopped at 08/01/18 2151    oxytocin (PITOCIN) 30 units in 500 mL infusion  1 charles-units/min Intravenous Continuous PRN Rocío Carson MD   Stopped at 08/02/18 1510    methylergonovine (METHERGINE) injection 200 mcg  200 mcg Intramuscular PRN Rocío Carson MD        carboprost (HEMABATE) injection 250 mcg  250 mcg Intramuscular PRN Rocío Carson MD        misoprostol (CYTOTEC) tablet 800 mcg  800 mcg Rectal PRN Rocío Carson MD        witch hazel-glycerin (TUCKS) pad   Topical PRN Rocío Carson MD        benzocaine-menthol (DERMOPLAST) 20-0.5 % spray   Topical PRN Rocío Carson MD         Social History     Social History    Marital status:      Spouse name: N/A    Number of children: N/A    Years of education: N/A     Occupational History    Not on file.      Social History Main Topics    Smoking status: Never Smoker    Smokeless tobacco: Never Used    Alcohol use No    Drug use: No    Sexual activity: Yes     Partners: Male     Other Topics Concern    Not on file     Social History Narrative    No narrative on file     Family History   Problem Relation Age of Onset    No Known Problems Paternal Grandfather     No Known Problems Paternal Grandmother     No Known Problems Maternal Grandmother     No Known Problems Maternal Grandfather     No Known Problems Father     No Known Problems Mother     No Known Problems Brother     No Known Problems Sister     No Known Problems Other     Breast Cancer Neg Hx     Cancer Neg Hx     Colon Cancer Neg Hx     Diabetes Neg Hx     [facility]) [facility] 4 mg    terbutaline (BRETHINE) injection 0.25 mg    oxytocin (PITOCIN) 30 units in 500 mL infusion    methylergonovine (METHERGINE) injection 200 mcg    carboprost (HEMABATE) injection 250 mcg    misoprostol (CYTOTEC) tablet 800 mcg    witch hazel-glycerin (TUCKS) pad    benzocaine-menthol (DERMOPLAST) 20-0.5 % spray    DISCONTD: zolpidem (AMBIEN) tablet 10 mg    zolpidem (AMBIEN) tablet 10 mg    fentaNYL 125 mcg, ropivacaine 0.2% in sodium chloride 0.9% 127.5ml (OB) epidural     CADNACE MUÑIZ: cabinet override    naloxone (NARCAN) injection 0.4 mg    nalbuphine (NUBAIN) injection 5 mg    ondansetron (ZOFRAN) injection 4 mg    fentaNYL 125 mcg, ropivacaine 0.2% in sodium chloride 0.9% 127.5ml (OB) epidural         Orders placed this encounter:  Orders Placed This Encounter   Procedures    Urinalysis     Standing Status:   Standing     Number of Occurrences:   1    CBC auto differential     Standing Status:   Standing     Number of Occurrences:   1    RPR     Standing Status:   Standing     Number of Occurrences:   1    Rubella antibody, IgG     Standing Status:   Standing     Number of Occurrences:   1    Hepatitis B surface antigen     Standing Status:   Standing     Number of Occurrences:   1    Comprehensive metabolic panel     Standing Status:   Standing     Number of Occurrences:   1    Protime-INR     Standing Status:   Standing     Number of Occurrences:   1    APTT     Standing Status:   Standing     Number of Occurrences:   1    Fibrinogen     Standing Status:   Standing     Number of Occurrences:   1    Uric acid     Standing Status:   Standing     Number of Occurrences:   1    DRUG SCREEN MULTI URINE     Standing Status:   Standing     Number of Occurrences:   1    Specimen to Pathology     Send placenta to pathology     Standing Status:   Standing     Number of Occurrences:   1    Microscopic Urinalysis     Standing Status:   Standing     Number of Occurrences:   1    DIET GENERAL; Vegetarian (Lacto-Ovo)     Standing Status:   Standing     Number of Occurrences:   1     Order Specific Question:   Additional Restrictions     Answer:   Vegetarian (Lacto-Ovo)    Verify informed consent     Standing Status:   Standing     Number of Occurrences:   1    Verify consent for Blood Transfusion     Standing Status:   Standing     Number of Occurrences:   1    Vital signs per unit routine     Standing Status:   Standing     Number of Occurrences:   1    Continuous external fetal heart rate monitoring     Standing Status:   Standing     Number of Occurrences:   1    External uterine contraction monitoring     Standing Status:   Standing     Number of Occurrences:   1    Notify physician for     Notify physician for pulse less than 50 or greater than 120, respiratory rate less than 12 or greater than 25, temperature greater than 101.3 F (38.5 C), urine output less than 30ml/hr, SBP less than 90 or greater than 160, DBP greater than 100, abnormal bleeding. Standing Status:   Standing     Number of Occurrences:   1    Up as tolerated     Standing Status:   Standing     Number of Occurrences:   68106    I/O per unit routine     Every shift if on IV [employer]. Standing Status:   Standing     Number of Occurrences:   1    Maintain IV access     Standing Status:   Standing     Number of Occurrences:   1    Position change     PRN Intra-uterine resuscitation for hypertonus, tachysystole, or non-reassuring fetal status. Standing Status:   Standing     Number of Occurrences:   1    Discontinue Pitocin     PRN Intra-uterine resuscitation for hypertonus, tachysystole, or non-reassuring fetal status. Standing Status:   Standing     Number of Occurrences:   1    Notify physician     Upon discontinuation of Pitocin.      Standing Status:   Standing     Number of Occurrences:   1    Increase IV fluids/IV bolus     PRN Intra-uterine resuscitation for hypertonus, tachysystole, or non-reassuring fetal status. Standing Status:   Standing     Number of Occurrences:   1    Notify nursery/Level II nursery     IMMEDIATE POSTPARTUM 1) If postitve GBS and/or antibiotic prophylaxis given. 2) If antibiotics not given at least 4 hours prior to delivery. Standing Status:   Standing     Number of Occurrences:   1    Ice pack to perineum     IMMEDIATE POSTPARTUM PRN for comfort. Standing Status:   Standing     Number of Occurrences:   1    Check with MD regarding ordering of cord blood gases     Nurse to verify cord blood gases ordered on infant medical record if meets criteria: ARTERIAL BLOOD GAS (79601. 1) VENOUS BLOOD GAS (73329.4) 1) Apgar less than 7 at 5 minutes. 2) Abnormal placenta/cord appearance. Standing Status:   Standing     Number of Occurrences:   1    Place intermittent pneumatic compression device until ambulatory     When appropriate prophylactic therapy cannot be provided due to patient limitations, serial screening Duplex Doppler Ultrasound studies of the legs may be considered to assess for development of DVT. IVC filter placement (temporary or permanent) may be considered in High or Very High Risk patients in whom prophylactic therapy cannot otherwise be noted. Standing Status:   Standing     Number of Occurrences:   1    Nursing communication     Pull cervidil at 630 in the morning. Dr. Jennifer Irving to AROM and place internal monitoring. Then to start Pitocin. Standing Status:   Standing     Number of Occurrences:   1    Patient may have epidural     Standing Status:   Standing     Number of Occurrences:   1    Insert indwelling urinary catheter     Place haddad while pt has an epidural   Remove when pushing     Standing Status:   Standing     Number of Occurrences:   1     Order Specific Question:   Indications for Urinary Catheter     Answer:    Other: (Please specify)    Discontinue indwelling urinary catheter when Question:   Estimated Length of Stay     Answer:   Estimated stay of more than 2 midnights     Order Specific Question:   Future Attending Provider     Answer:   Keena Gomez [[id number]]

## 2018-09-11 ENCOUNTER — OFFICE VISIT (OUTPATIENT)
Dept: OBGYN CLINIC | Age: 28
End: 2018-09-11

## 2018-09-11 VITALS
SYSTOLIC BLOOD PRESSURE: 100 MMHG | HEIGHT: 66 IN | DIASTOLIC BLOOD PRESSURE: 70 MMHG | BODY MASS INDEX: 23.14 KG/M2 | WEIGHT: 144 LBS

## 2018-09-11 PROCEDURE — 0503F POSTPARTUM CARE VISIT: CPT | Performed by: OBSTETRICS & GYNECOLOGY

## 2018-09-11 ASSESSMENT — ENCOUNTER SYMPTOMS
ANAL BLEEDING: 0
ABDOMINAL PAIN: 0
DIARRHEA: 0
BLOOD IN STOOL: 0
RESPIRATORY NEGATIVE: 1
VOMITING: 0
ABDOMINAL DISTENTION: 0
ALLERGIC/IMMUNOLOGIC NEGATIVE: 1
EYES NEGATIVE: 1
CONSTIPATION: 0
RECTAL PAIN: 0
NAUSEA: 0

## 2018-09-11 NOTE — PROGRESS NOTES
Routine postpartum visit. Bottle feeding. No complaints. Discussed birth control options and not interested at this time. States they will use condoms. Exam normal.   F/U annual exam or prn. Vitals:  /70   Ht 5' 6\" (1.676 m)   Wt 144 lb (65.3 kg)   BMI 23.24 kg/m²   Past Medical History:   Diagnosis Date    Anemia      Past Surgical History:   Procedure Laterality Date    ANTERIOR CRUCIATE LIGAMENT REPAIR       Allergies:  Patient has no known allergies. Current Outpatient Prescriptions   Medication Sig Dispense Refill    ibuprofen (ADVIL;MOTRIN) 800 MG tablet Take 1 tablet by mouth every 8 hours as needed for Pain 120 tablet 3    Misc. Devices (BREAST PUMP) MISC 1 kit by Does not apply route as needed (pumping breast milk) 1 each 0    ferrous sulfate (VILMA-MÓNICA) 325 (65 Fe) MG tablet Take 1 tablet by mouth 3 times daily (with meals) 90 tablet 3    Uxnpme-YuPdh-Uxmql-FA-DHA w/oA (VITAPEARL) 30-1.4-200 MG CPCR Lot [id number] Exp. 10/2018 Samples given to patient 6 capsule 0    Zinc Oxide (DR SMITHS DIAPER) 10 % OINT Lot [id number] Exp none - Samples given to patient 0.5 gm 1 Tube 0    ferrous sulfate (VILMA-MÓNICA) 325 (65 FE) MG tablet Take 1 tablet by mouth 3 times daily (with meals) 90 tablet 3    Prenatal Vit-Fe Fumarate-FA (PRENATAL VITAMIN PO) Take by mouth       No current facility-administered medications for this visit. Social History     Social History    Marital status:      Spouse name: N/A    Number of children: N/A    Years of education: N/A     Occupational History    Not on file.      Social History Main Topics    Smoking status: Never Smoker    Smokeless tobacco: Never Used    Alcohol use No    Drug use: No    Sexual activity: Yes     Partners: Male     Other Topics Concern    Not on file     Social History Narrative    No narrative on file     Family History   Problem Relation Age of Onset    No Known Problems Paternal Grandfather     No Known Problems

## 2019-05-28 ENCOUNTER — OFFICE VISIT (OUTPATIENT)
Dept: OBGYN CLINIC | Age: 29
End: 2019-05-28
Payer: COMMERCIAL

## 2019-05-28 VITALS
SYSTOLIC BLOOD PRESSURE: 120 MMHG | HEIGHT: 66 IN | WEIGHT: 133 LBS | DIASTOLIC BLOOD PRESSURE: 72 MMHG | BODY MASS INDEX: 21.38 KG/M2

## 2019-05-28 DIAGNOSIS — Z01.419 WOMEN'S ANNUAL ROUTINE GYNECOLOGICAL EXAMINATION: Primary | ICD-10-CM

## 2019-05-28 DIAGNOSIS — Z11.51 SCREENING FOR HUMAN PAPILLOMAVIRUS: ICD-10-CM

## 2019-05-28 PROCEDURE — 99395 PREV VISIT EST AGE 18-39: CPT | Performed by: OBSTETRICS & GYNECOLOGY

## 2019-05-28 ASSESSMENT — ENCOUNTER SYMPTOMS
EYES NEGATIVE: 1
ANAL BLEEDING: 0
RECTAL PAIN: 0
DIARRHEA: 0
ALLERGIC/IMMUNOLOGIC NEGATIVE: 1
CONSTIPATION: 0
RESPIRATORY NEGATIVE: 1
ABDOMINAL DISTENTION: 0
ABDOMINAL PAIN: 0
BLOOD IN STOOL: 0
VOMITING: 0
NAUSEA: 0

## 2019-05-28 NOTE — PROGRESS NOTES
The patient was asked if she would like a chaperone present for her intimate exam. She  Declined the chaperone.  Seth Mcconnell

## 2019-05-28 NOTE — PROGRESS NOTES
Subjective:      Patient ID: Roger Watson is a 25 y.o. female    Annual exam.  No GYN complaints. Normal cycles. Pap performed. STD screening offered. Monthly SBE encouraged. F/U annual or prn. Vitals:  /72   Ht 5' 6\" (1.676 m)   Wt 133 lb (60.3 kg)   LMP 05/21/2019   BMI 21.47 kg/m²   Past Medical History:   Diagnosis Date    Anemia      Past Surgical History:   Procedure Laterality Date    ANTERIOR CRUCIATE LIGAMENT REPAIR       Allergies:  Patient has no known allergies. No current outpatient medications on file. No current facility-administered medications for this visit.       Social History     Socioeconomic History    Marital status:      Spouse name: Not on file    Number of children: Not on file    Years of education: Not on file    Highest education level: Not on file   Occupational History    Not on file   Social Needs    Financial resource strain: Not on file    Food insecurity:     Worry: Not on file     Inability: Not on file    Transportation needs:     Medical: Not on file     Non-medical: Not on file   Tobacco Use    Smoking status: Never Smoker    Smokeless tobacco: Never Used   Substance and Sexual Activity    Alcohol use: No    Drug use: No    Sexual activity: Yes     Partners: Male   Lifestyle    Physical activity:     Days per week: Not on file     Minutes per session: Not on file    Stress: Not on file   Relationships    Social connections:     Talks on phone: Not on file     Gets together: Not on file     Attends Caodaism service: Not on file     Active member of club or organization: Not on file     Attends meetings of clubs or organizations: Not on file     Relationship status: Not on file    Intimate partner violence:     Fear of current or ex partner: Not on file     Emotionally abused: Not on file     Physically abused: Not on file     Forced sexual activity: Not on file   Other Topics Concern    Not on file   Social History Narrative  Not on file     Family History   Problem Relation Age of Onset    No Known Problems Paternal Grandfather     No Known Problems Paternal Grandmother     No Known Problems Maternal Grandmother     No Known Problems Maternal Grandfather     No Known Problems Father     No Known Problems Mother     No Known Problems Brother     No Known Problems Sister     No Known Problems Other     Breast Cancer Neg Hx     Cancer Neg Hx     Colon Cancer Neg Hx     Diabetes Neg Hx     Eclampsia Neg Hx     Hypertension Neg Hx     Ovarian Cancer Neg Hx      Labor Neg Hx     Spont Abortions Neg Hx     Stroke Neg Hx        Review of Systems   Constitutional: Negative. Negative for activity change, appetite change, chills, diaphoresis, fatigue, fever and unexpected weight change. HENT: Negative. Eyes: Negative. Respiratory: Negative. Cardiovascular: Negative. Gastrointestinal: Negative for abdominal distention, abdominal pain, anal bleeding, blood in stool, constipation, diarrhea, nausea, rectal pain and vomiting. Endocrine: Negative. Genitourinary: Negative for decreased urine volume, difficulty urinating, dyspareunia, dysuria, enuresis, flank pain, frequency, genital sores, hematuria, menstrual problem, pelvic pain, urgency, vaginal bleeding, vaginal discharge and vaginal pain. Musculoskeletal: Negative. Skin: Negative. Allergic/Immunologic: Negative. Neurological: Negative. Hematological: Negative. Psychiatric/Behavioral: Negative. Objective:     Physical Exam   Constitutional: She is oriented to person, place, and time. She appears well-developed and well-nourished. HENT:   Head: Normocephalic. Neck: Normal range of motion. Neck supple. No thyromegaly present. Cardiovascular: Normal rate, regular rhythm and normal heart sounds. Pulmonary/Chest: Effort normal and breath sounds normal. No respiratory distress. She has no wheezes. She has no rales.  She exhibits no tenderness. Right breast exhibits no mass, no nipple discharge, no skin change and no tenderness. Left breast exhibits no mass, no nipple discharge, no skin change and no tenderness. No breast swelling, tenderness, discharge or bleeding. Abdominal: Soft. Bowel sounds are normal. She exhibits no distension and no mass. There is no tenderness. There is no rebound and no guarding. Hernia confirmed negative in the right inguinal area and confirmed negative in the left inguinal area. Genitourinary: Rectum normal, vagina normal and uterus normal. No breast swelling, tenderness, discharge or bleeding. No labial fusion. There is no rash, tenderness, lesion or injury on the right labia. There is no rash, tenderness, lesion or injury on the left labia. Uterus is not deviated, not enlarged, not fixed and not tender. Cervix exhibits no motion tenderness, no discharge and no friability. Right adnexum displays no mass, no tenderness and no fullness. Left adnexum displays no mass, no tenderness and no fullness. No erythema, tenderness or bleeding in the vagina. No foreign body in the vagina. No signs of injury around the vagina. No vaginal discharge found. Musculoskeletal: Normal range of motion. She exhibits no edema or tenderness. Lymphadenopathy:     She has no cervical adenopathy. Right: No inguinal adenopathy present. Left: No inguinal adenopathy present. Neurological: She is alert and oriented to person, place, and time. Skin: Skin is warm and dry. No rash noted. No erythema. No pallor. Psychiatric: She has a normal mood and affect. Her behavior is normal. Judgment and thought content normal.       Assessment:       Diagnosis Orders   1. Women's annual routine gynecological examination  PAP SMEAR   2. Screening for human papillomavirus  PAP SMEAR        Plan:      Medications placedthis encounter:  No orders of the defined types were placed in this encounter.         Orders placedthis encounter:  Orders Placed This Encounter   Procedures    PAP SMEAR     Standing Status:   Future     Standing Expiration Date:   5/28/2020     Order Specific Question:   Collection Type     Answer: Thin Prep     Order Specific Question:   Prior Abnormal Pap Test     Answer:   No     Order Specific Question:   Screening or Diagnostic     Answer:   Screening     Order Specific Question:   HPV Requested?      Answer:   Yes     Order Specific Question:   High Risk Patient     Answer:   N/A         Follow up:  Return in about 1 year (around 5/28/2020) for Annual.

## 2019-06-07 DIAGNOSIS — Z01.419 WOMEN'S ANNUAL ROUTINE GYNECOLOGICAL EXAMINATION: ICD-10-CM

## 2019-06-07 DIAGNOSIS — Z11.51 SCREENING FOR HUMAN PAPILLOMAVIRUS: ICD-10-CM

## 2020-06-03 ENCOUNTER — OFFICE VISIT (OUTPATIENT)
Dept: OBGYN CLINIC | Age: 30
End: 2020-06-03
Payer: COMMERCIAL

## 2020-06-03 VITALS
HEIGHT: 66 IN | SYSTOLIC BLOOD PRESSURE: 112 MMHG | BODY MASS INDEX: 21.05 KG/M2 | DIASTOLIC BLOOD PRESSURE: 64 MMHG | WEIGHT: 131 LBS

## 2020-06-03 PROCEDURE — 99395 PREV VISIT EST AGE 18-39: CPT | Performed by: OBSTETRICS & GYNECOLOGY

## 2020-06-03 ASSESSMENT — ENCOUNTER SYMPTOMS
ABDOMINAL PAIN: 0
NAUSEA: 0
DIARRHEA: 0
ALLERGIC/IMMUNOLOGIC NEGATIVE: 1
RESPIRATORY NEGATIVE: 1
CONSTIPATION: 0
RECTAL PAIN: 0
ANAL BLEEDING: 0
BLOOD IN STOOL: 0
ABDOMINAL DISTENTION: 0
EYES NEGATIVE: 1
VOMITING: 0

## 2020-06-03 NOTE — PROGRESS NOTES
Cytology exam starts age 24. If Negative Cytology;  follow-up screening per current guidelines. Mammograms yearly starting at age 36. Calcium and Vitamin D dosing reviewed ( age appropriate ). Colonoscopy and bone density screening discussed ( age appropriate ). Birth control and STD prevention discussed ( age appropriate ). Gardisil counseling completed for all patients 7-35 yo. Routine health maintenance ( per PCP and guidelines ).

## 2020-06-11 NOTE — LETTER
[facility]  [address] [address]  Phone: [phone]  Fax: [phone]    Evert Dawson MD        June 16, 2020    [place] [address]      Dear Jonathan Reilly:    The results of your most recent Pap smear are normal. This means that no cancerous or precancerous cells were seen. We recommend that you come back in 1 year for your next routine Pap smear. If you have any questions or concerns, please don't hesitate to call.     Sincerely,        Evert Dawson MD

## 2021-04-15 ENCOUNTER — OFFICE VISIT (OUTPATIENT)
Dept: OBGYN CLINIC | Age: 31
End: 2021-04-15
Payer: COMMERCIAL

## 2021-04-15 VITALS
HEIGHT: 66 IN | DIASTOLIC BLOOD PRESSURE: 70 MMHG | WEIGHT: 133 LBS | SYSTOLIC BLOOD PRESSURE: 110 MMHG | BODY MASS INDEX: 21.38 KG/M2

## 2021-04-15 DIAGNOSIS — R39.15 URGENCY OF URINATION: ICD-10-CM

## 2021-04-15 DIAGNOSIS — N39.41 URGENCY INCONTINENCE: ICD-10-CM

## 2021-04-15 DIAGNOSIS — R35.0 URINARY FREQUENCY: Primary | ICD-10-CM

## 2021-04-15 DIAGNOSIS — R35.0 URINARY FREQUENCY: ICD-10-CM

## 2021-04-15 LAB
BILIRUBIN, POC: NORMAL
BLOOD URINE, POC: NORMAL
CLARITY, POC: NORMAL
COLOR, POC: NORMAL
GLUCOSE URINE, POC: NORMAL
KETONES, POC: NORMAL
LEUKOCYTE EST, POC: NORMAL
NITRITE, POC: NORMAL
PH, POC: 6
PROTEIN, POC: NORMAL
SPECIFIC GRAVITY, POC: 1.02
UROBILINOGEN, POC: NORMAL

## 2021-04-15 PROCEDURE — 81002 URINALYSIS NONAUTO W/O SCOPE: CPT | Performed by: OBSTETRICS & GYNECOLOGY

## 2021-04-15 PROCEDURE — G8420 CALC BMI NORM PARAMETERS: HCPCS | Performed by: OBSTETRICS & GYNECOLOGY

## 2021-04-15 PROCEDURE — G8427 DOCREV CUR MEDS BY ELIG CLIN: HCPCS | Performed by: OBSTETRICS & GYNECOLOGY

## 2021-04-15 PROCEDURE — 99212 OFFICE O/P EST SF 10 MIN: CPT | Performed by: OBSTETRICS & GYNECOLOGY

## 2021-04-15 PROCEDURE — 1036F TOBACCO NON-USER: CPT | Performed by: OBSTETRICS & GYNECOLOGY

## 2021-04-15 ASSESSMENT — ENCOUNTER SYMPTOMS
ALLERGIC/IMMUNOLOGIC NEGATIVE: 1
BLOOD IN STOOL: 0
RESPIRATORY NEGATIVE: 1
ABDOMINAL PAIN: 0
CONSTIPATION: 0
ABDOMINAL DISTENTION: 0
EYES NEGATIVE: 1
DIARRHEA: 0
VOMITING: 0
ANAL BLEEDING: 0
NAUSEA: 0
RECTAL PAIN: 0

## 2021-04-15 ASSESSMENT — PATIENT HEALTH QUESTIONNAIRE - PHQ9
SUM OF ALL RESPONSES TO PHQ QUESTIONS 1-9: 0
SUM OF ALL RESPONSES TO PHQ QUESTIONS 1-9: 0
1. LITTLE INTEREST OR PLEASURE IN DOING THINGS: 0
SUM OF ALL RESPONSES TO PHQ QUESTIONS 1-9: 0

## 2021-04-15 NOTE — PROGRESS NOTES
Patient here c/o urinary urgency and incontinence. States she has noticed increasing episodes of urgency and often feels like she is not going to make it to the restroom. Urine dip today + leuks and ketones, otherwise negative. Sent for cx. Discussed MINA vs DI and treatments. Sx most c/w DI and offered Myrbetriq 25 mg po qd. Risks and benefits discussed. F/U 6/2021 for annual exam and med check. All questions answered. Pt was seen with total face to face time of 10 minutes with more than 50% of the visit being counseling and education regarding encounter dx of DI. See discussion /counseling details as stated above. Vitals:  /70   Ht 5' 6\" (1.676 m)   Wt 133 lb (60.3 kg)   LMP 03/31/2021   BMI 21.47 kg/m²   Past Medical History:   Diagnosis Date    Anemia      Past Surgical History:   Procedure Laterality Date    ANTERIOR CRUCIATE LIGAMENT REPAIR       Allergies:  Patient has no known allergies. Current Outpatient Medications   Medication Sig Dispense Refill    mirabegron (MYRBETRIQ) 25 MG TB24 Take 1 tablet by mouth daily Lot [id number] Exp 12/2022 28 tablet 0    mirabegron (MYRBETRIQ) 25 MG TB24 Take 1 tablet by mouth daily 90 tablet 2     No current facility-administered medications for this visit.       Social History     Socioeconomic History    Marital status:      Spouse name: Not on file    Number of children: Not on file    Years of education: Not on file    Highest education level: Not on file   Occupational History    Not on file   Social Needs    Financial resource strain: Not on file    Food insecurity     Worry: Not on file     Inability: Not on file    Transportation needs     Medical: Not on file     Non-medical: Not on file   Tobacco Use    Smoking status: Never Smoker    Smokeless tobacco: Never Used   Substance and Sexual Activity    Alcohol use: No    Drug use: No    Sexual activity: Yes     Partners: Male   Lifestyle    Physical activity Days per week: Not on file     Minutes per session: Not on file    Stress: Not on file   Relationships    Social connections     Talks on phone: Not on file     Gets together: Not on file     Attends Church service: Not on file     Active member of club or organization: Not on file     Attends meetings of clubs or organizations: Not on file     Relationship status: Not on file    Intimate partner violence     Fear of current or ex partner: Not on file     Emotionally abused: Not on file     Physically abused: Not on file     Forced sexual activity: Not on file   Other Topics Concern    Not on file   Social History Narrative    Not on file        Family History   Problem Relation Age of Onset    No Known Problems Paternal Grandfather     No Known Problems Paternal Grandmother     No Known Problems Maternal Grandmother     No Known Problems Maternal Grandfather     No Known Problems Father     No Known Problems Mother     No Known Problems Brother     No Known Problems Sister     No Known Problems Other     Breast Cancer Neg Hx     Cancer Neg Hx     Colon Cancer Neg Hx     Diabetes Neg Hx     Eclampsia Neg Hx     Hypertension Neg Hx     Ovarian Cancer Neg Hx      Labor Neg Hx     Spont Abortions Neg Hx     Stroke Neg Hx        Review of Systems   Constitutional: Negative. Negative for activity change, appetite change, chills, diaphoresis, fatigue, fever and unexpected weight change. HENT: Negative. Eyes: Negative. Respiratory: Negative. Cardiovascular: Negative. Gastrointestinal: Negative for abdominal distention, abdominal pain, anal bleeding, blood in stool, constipation, diarrhea, nausea, rectal pain and vomiting. Endocrine: Negative. Genitourinary: Positive for urgency.  Negative for decreased urine volume, difficulty urinating, dyspareunia, dysuria, enuresis, flank pain, frequency, genital sores, hematuria, menstrual problem, pelvic pain, vaginal bleeding, vaginal discharge and vaginal pain. Musculoskeletal: Negative. Skin: Negative. Allergic/Immunologic: Negative. Neurological: Negative. Hematological: Negative. Psychiatric/Behavioral: Negative. Objective:     Physical Exam  Constitutional:       General: She is not in acute distress. Appearance: She is well-developed. She is not diaphoretic. HENT:      Head: Normocephalic and atraumatic. Eyes:      Conjunctiva/sclera: Conjunctivae normal.   Neck:      Musculoskeletal: Normal range of motion and neck supple. Cardiovascular:      Rate and Rhythm: Normal rate and regular rhythm. Pulmonary:      Effort: Pulmonary effort is normal. No respiratory distress. Musculoskeletal: Normal range of motion. General: No tenderness or deformity. Skin:     General: Skin is warm and dry. Coloration: Skin is not pale. Neurological:      Mental Status: She is alert and oriented to person, place, and time. Motor: No abnormal muscle tone. Coordination: Coordination normal.   Psychiatric:         Behavior: Behavior normal.         Thought Content: Thought content normal.         Judgment: Judgment normal.         Assessment:          Diagnosis Orders   1. Urinary frequency  POCT Urinalysis no Micro    Culture, Urine   2. Urgency incontinence     3. Urgency of urination          Plan:      Medications placedthis encounter:  Orders Placed This Encounter   Medications    mirabegron (MYRBETRIQ) 25 MG TB24     Sig: Take 1 tablet by mouth daily Lot [id number] Exp 12/2022     Dispense:  28 tablet     Refill:  0    mirabegron (MYRBETRIQ) 25 MG TB24     Sig: Take 1 tablet by mouth daily     Dispense:  90 tablet     Refill:  2         Orders placedthis encounter:  Orders Placed This Encounter   Procedures    Culture, Urine     Standing Status:   Future     Standing Expiration Date:   4/15/2022     Order Specific Question:   Specify (ex-cath, midstream, cysto, etc)?      Answer: midstream    POCT Urinalysis no Micro         Follow up:  Return for Annual.

## 2021-04-17 LAB — URINE CULTURE, ROUTINE: NORMAL

## 2021-04-28 ENCOUNTER — TELEPHONE (OUTPATIENT)
Dept: OBGYN CLINIC | Age: 31
End: 2021-04-28

## 2021-04-28 DIAGNOSIS — N39.41 URGENCY INCONTINENCE: ICD-10-CM

## 2021-04-28 DIAGNOSIS — R35.0 URINARY FREQUENCY: Primary | ICD-10-CM

## 2021-04-28 NOTE — TELEPHONE ENCOUNTER
Pt was prescribed myrbetriq 25 mg on 4-15-21. Pt states that she has been taking this Rx for about 2 weeks and her symptoms ( frequent urination) have gotten worse. Please advise what she should do.

## 2021-04-29 NOTE — TELEPHONE ENCOUNTER
Not a normal reaction to this med. Please place a referral to Dr Florin Darling for further evaluation.

## 2021-06-30 ENCOUNTER — OFFICE VISIT (OUTPATIENT)
Dept: OBGYN CLINIC | Age: 31
End: 2021-06-30
Payer: COMMERCIAL

## 2021-06-30 VITALS
WEIGHT: 129 LBS | DIASTOLIC BLOOD PRESSURE: 64 MMHG | SYSTOLIC BLOOD PRESSURE: 110 MMHG | BODY MASS INDEX: 20.73 KG/M2 | HEIGHT: 66 IN

## 2021-06-30 DIAGNOSIS — Z11.51 SCREENING FOR HUMAN PAPILLOMAVIRUS: ICD-10-CM

## 2021-06-30 DIAGNOSIS — Z01.419 WOMEN'S ANNUAL ROUTINE GYNECOLOGICAL EXAMINATION: Primary | ICD-10-CM

## 2021-06-30 DIAGNOSIS — Z76.89 ENCOUNTER TO ESTABLISH CARE: ICD-10-CM

## 2021-06-30 PROCEDURE — 99395 PREV VISIT EST AGE 18-39: CPT | Performed by: OBSTETRICS & GYNECOLOGY

## 2021-06-30 ASSESSMENT — ENCOUNTER SYMPTOMS
ANAL BLEEDING: 0
DIARRHEA: 0
ALLERGIC/IMMUNOLOGIC NEGATIVE: 1
NAUSEA: 0
BLOOD IN STOOL: 0
RESPIRATORY NEGATIVE: 1
EYES NEGATIVE: 1
CONSTIPATION: 0
VOMITING: 0
RECTAL PAIN: 0
ABDOMINAL DISTENTION: 0
ABDOMINAL PAIN: 0

## 2021-06-30 ASSESSMENT — VISUAL ACUITY: OU: 1

## 2021-06-30 NOTE — PROGRESS NOTES
Subjective:      Patient ID: Pita Luis is a 26 y.o. female    Annual exam.  No GYN complaints. Normal cycles. Pap performed. STD screening offered. Monthly SBE encouraged. F/U annual or prn. Vitals:  /64   Ht 5' 6\" (1.676 m)   Wt 129 lb (58.5 kg)   LMP 06/17/2021   BMI 20.82 kg/m²   Past Medical History:   Diagnosis Date    Anemia      Past Surgical History:   Procedure Laterality Date    ANTERIOR CRUCIATE LIGAMENT REPAIR       Allergies:  Patient has no known allergies. Current Outpatient Medications   Medication Sig Dispense Refill    mirabegron (MYRBETRIQ) 25 MG TB24 Take 1 tablet by mouth daily 90 tablet 2     No current facility-administered medications for this visit. Social History     Socioeconomic History    Marital status:      Spouse name: Not on file    Number of children: Not on file    Years of education: Not on file    Highest education level: Not on file   Occupational History    Not on file   Tobacco Use    Smoking status: Never Smoker    Smokeless tobacco: Never Used   Substance and Sexual Activity    Alcohol use: No    Drug use: No    Sexual activity: Yes     Partners: Male   Other Topics Concern    Not on file   Social History Narrative    Not on file     Social Determinants of Health     Financial Resource Strain:     Difficulty of Paying Living Expenses:    Food Insecurity:     Worried About Running Out of Food in the Last Year:     [address] in the Last Year:    Transportation Needs:     Lack of Transportation (Medical):      Lack of Transportation (Non-Medical):    Physical Activity:     Days of Exercise per Week:     Minutes of Exercise per Session:    Stress:     Feeling of Stress :    Social Connections:     Frequency of Communication with Friends and Family:     Frequency of Social Gatherings with Friends and Family:     Attends [employer] Services:     Active Member of Clubs or Organizations:     Attends Club or and regular rhythm. Heart sounds: Normal heart sounds. Pulmonary:      Effort: Pulmonary effort is normal. No respiratory distress. Breath sounds: Normal breath sounds. No wheezing or rales. Chest:      Chest wall: No tenderness. Breasts:         Right: Normal. No swelling, bleeding, inverted nipple, mass, nipple discharge, skin change or tenderness. Left: Normal. No swelling, bleeding, inverted nipple, mass, nipple discharge, skin change or tenderness. Abdominal:      General: There is no distension. Palpations: Abdomen is soft. There is no mass. Tenderness: There is no abdominal tenderness. There is no guarding or rebound. Hernia: No hernia is present. There is no hernia in the left inguinal area or right inguinal area. Genitourinary:     General: Normal vulva. Pubic Area: No rash. Labia:         Right: No rash, tenderness, lesion or injury. Left: No rash, tenderness, lesion or injury. Urethra: No prolapse, urethral swelling or urethral lesion. Vagina: Normal. No signs of injury and foreign body. No vaginal discharge, erythema, tenderness or bleeding. Cervix: No cervical motion tenderness, discharge or friability. Uterus: Not deviated, not enlarged, not fixed and not tender. Adnexa:         Right: No mass, tenderness or fullness. Left: No mass, tenderness or fullness. Rectum: Normal.   Musculoskeletal:         General: No tenderness. Normal range of motion. Cervical back: Normal range of motion and neck supple. Lymphadenopathy:      Cervical: No cervical adenopathy. Upper Body:      Right upper body: No supraclavicular or axillary adenopathy. Left upper body: No supraclavicular or axillary adenopathy. Lower Body: No right inguinal adenopathy. No left inguinal adenopathy. Skin:     General: Skin is warm and dry. Coloration: Skin is not pale. Findings: No erythema or rash. Neurological:      Mental Status: She is alert and oriented to person, place, and time. Psychiatric:         Behavior: Behavior normal.         Thought Content: Thought content normal.         Judgment: Judgment normal.         Assessment:       Diagnosis Orders   1. Women's annual routine gynecological examination  PAP SMEAR   2. Screening for human papillomavirus  PAP SMEAR   3. Encounter to establish care  Ambulatory referral to Family Practice        Plan:      Medications placedthis encounter:  No orders of the defined types were placed in this encounter. Orders placedthis encounter:  Orders Placed This Encounter   Procedures    PAP SMEAR     Standing Status:   Future     Standing Expiration Date:   6/30/2022     Order Specific Question:   Collection Type     Answer: Thin Prep     Order Specific Question:   Prior Abnormal Pap Test     Answer:   No     Order Specific Question:   Screening or Diagnostic     Answer:   Screening     Order Specific Question:   HPV Requested? Answer:   Yes     Order Specific Question:   High Risk Patient     Answer:   N/A    Ambulatory referral to Family Practice     Referral Priority:   Routine     Referral Type:   Consult for Advice and Opinion     Number of Visits Requested:   1         Follow up:  Return in about 1 year (around 6/30/2022) for Annual.   Repeat Annual GYN exam every 1 year. Cervical Cytology exam starts age 24. If Negative Cytology;  follow-up screening per current guidelines. Mammograms yearly starting at age 36. Calcium and Vitamin D dosing reviewed ( age appropriate ). Colonoscopy and bone density screening discussed ( age appropriate ). Birth control and STD prevention discussed ( age appropriate ). Gardisil counseling completed for all patients ( age appropriate ). Routine health maintenance ( per PCP and guidelines ).                   The patient was asked if she would like a chaperone present for her intimate exam. She  Declined the chaperone.  Coco Guerra MD

## 2021-07-09 DIAGNOSIS — Z01.419 WOMEN'S ANNUAL ROUTINE GYNECOLOGICAL EXAMINATION: ICD-10-CM

## 2021-07-09 DIAGNOSIS — Z11.51 SCREENING FOR HUMAN PAPILLOMAVIRUS: ICD-10-CM

## 2021-08-04 ENCOUNTER — TELEPHONE (OUTPATIENT)
Dept: FAMILY MEDICINE CLINIC | Age: 31
End: 2021-08-04

## 2021-09-08 ENCOUNTER — OFFICE VISIT (OUTPATIENT)
Dept: FAMILY MEDICINE CLINIC | Age: 31
End: 2021-09-08
Payer: COMMERCIAL

## 2021-09-08 VITALS
DIASTOLIC BLOOD PRESSURE: 78 MMHG | BODY MASS INDEX: 21.41 KG/M2 | WEIGHT: 133.2 LBS | HEIGHT: 66 IN | TEMPERATURE: 99.1 F | SYSTOLIC BLOOD PRESSURE: 130 MMHG | HEART RATE: 80 BPM | OXYGEN SATURATION: 98 %

## 2021-09-08 DIAGNOSIS — N32.89 BLADDER SPASMS: ICD-10-CM

## 2021-09-08 DIAGNOSIS — Z00.00 ANNUAL PHYSICAL EXAM: Primary | ICD-10-CM

## 2021-09-08 PROCEDURE — 99385 PREV VISIT NEW AGE 18-39: CPT | Performed by: NURSE PRACTITIONER

## 2021-09-08 SDOH — ECONOMIC STABILITY: FOOD INSECURITY: WITHIN THE PAST 12 MONTHS, THE FOOD YOU BOUGHT JUST DIDN'T LAST AND YOU DIDN'T HAVE MONEY TO GET MORE.: NEVER TRUE

## 2021-09-08 SDOH — ECONOMIC STABILITY: FOOD INSECURITY: WITHIN THE PAST 12 MONTHS, YOU WORRIED THAT YOUR FOOD WOULD RUN OUT BEFORE YOU GOT MONEY TO BUY MORE.: NEVER TRUE

## 2021-09-08 ASSESSMENT — SOCIAL DETERMINANTS OF HEALTH (SDOH): HOW HARD IS IT FOR YOU TO PAY FOR THE VERY BASICS LIKE FOOD, HOUSING, MEDICAL CARE, AND HEATING?: NOT HARD AT ALL

## 2021-09-08 NOTE — PROGRESS NOTES
[address] [facility] [address] [facility]  [address] [address]  Dept: [phone]  Dept Fax: [phone]  Loc: [phone]     [address] 33 y.o. female presents 9/8/21 with   Chief Complaint   Patient presents with   [facility].    Other     bladder spasms. Was taking meds but did not help so she stopped. Possible anemia, cholesterol issues. HPI     Patient here to establish care and states she has had an issue with bladder spasms. This problem has been going on for quite some time now. She thinks it is related to anxiety, knowing certain things are going on she has bladder spasms and thinks she has to go to the bathroom. Has never had an accident but there has been times where she needs to cross her legs. Does not happen at home or in the middle of the night. Thinks it might be related to anxiety but is more anxious if she knows she is in a situation where she will not be able to get to a bathroom. If not thinking about it does not have issues. Was on mybetriq for a little over two months which did not make a difference so she stopped it. Started out on 25 mg and was increased to 50 mg. Also saw urologist did not have any internal issues, no prolapsing, strong pelvic floor. Was told to decrease coffee, does not drink alcohol very often, tries to drink around one cup a day or decaf, which definitely helps. Admits that she is not typically an anxious person.     Reviewed the following history:    Past Medical History:   Diagnosis Date    Anemia      Past Surgical History:   Procedure Laterality Date    ANTERIOR CRUCIATE LIGAMENT REPAIR       Family History   Problem Relation Age of Onset    No Known Problems Paternal Grandfather     No Known Problems Paternal Grandmother     No Known Problems Maternal Grandmother     No Known Problems Maternal Grandfather     No Known Problems Father  No Known Problems Mother     No Known Problems Brother     No Known Problems Sister     No Known Problems Other     Breast Cancer Neg Hx     Cancer Neg Hx     Colon Cancer Neg Hx     Diabetes Neg Hx     Eclampsia Neg Hx     Hypertension Neg Hx     Ovarian Cancer Neg Hx      Labor Neg Hx     Spont Abortions Neg Hx     Stroke Neg Hx        No Known Allergies    Current Outpatient Medications on File Prior to Visit   Medication Sig Dispense Refill    mirabegron (MYRBETRIQ) 25 MG TB24 Take 1 tablet by mouth daily 90 tablet 2     No current facility-administered medications on file prior to visit. Review of Systems   Constitutional: Negative. HENT: Negative. Respiratory: Negative. Cardiovascular: Negative. Gastrointestinal: Negative. Genitourinary: Positive for frequency (situational). Skin: Negative. Psychiatric/Behavioral: The patient is nervous/anxious (situational). Objective    Vitals:    21 1558   BP: 130/78   Site: Right Upper Arm   Position: Sitting   Cuff Size: Medium Adult   Pulse: 80   Temp: 99.1 °F (37.3 °C)   TempSrc: Infrared   SpO2: 98%   Weight: 133 lb 3.2 oz (60.4 kg)   Height: 5' 6\" (1.676 m)       Physical Exam  Constitutional:       General: She is not in acute distress. Appearance: Normal appearance. She is not ill-appearing or toxic-appearing. HENT:      Head: Normocephalic and atraumatic. Right Ear: Hearing and external ear normal.      Left Ear: Hearing and external ear normal.   Eyes:      General: Lids are normal.      Conjunctiva/sclera: Conjunctivae normal.   Cardiovascular:      Rate and Rhythm: Normal rate and regular rhythm. Heart sounds: Normal heart sounds. Pulmonary:      Effort: Pulmonary effort is normal. No respiratory distress. Breath sounds: Normal breath sounds. No decreased breath sounds, wheezing, rhonchi or rales. Musculoskeletal:      Cervical back: Normal range of motion and neck supple. Skin:     General: Skin is warm and dry. Neurological:      General: No focal deficit present. Mental Status: She is alert and oriented to person, place, and time. Psychiatric:         Attention and Perception: Attention normal.         Mood and Affect: Mood normal.         Speech: Speech normal.         Behavior: Behavior normal.         Thought Content: Thought content normal.         Cognition and Memory: Cognition normal.         Judgment: Judgment normal.       Assessment and Plan    [facility] 33 y.o. female presenting for establish care, situational urinary urgency     1. Annual physical exam  - Comprehensive Metabolic Panel; Future  - TSH with Reflex; Future  - CBC With Auto Differential; Future    2. Bladder spasms  - Ambulatory referral to Urogynecology  - Advised to restart Myrbetriq, follow up with GYN. Return if symptoms worsen or fail to improve, for follow up, PRN for any acute conditions. Side effects and adverse effects of any medication prescribed today, as well as treatment plan/rationale, follow-up care, and result expectations have been discussed with the patient. Expresses understanding and desires to proceed with treatment plan. Discussed signs and symptoms which require immediate follow-up in ED/call to 911. Understanding verbalized. I have reviewed and updated the electronic medical record.     Jared Cook, CORA - CNP

## 2021-09-14 ENCOUNTER — NURSE ONLY (OUTPATIENT)
Dept: FAMILY MEDICINE CLINIC | Age: 31
End: 2021-09-14
Payer: COMMERCIAL

## 2021-09-14 ENCOUNTER — HOSPITAL ENCOUNTER (OUTPATIENT)
Age: 31
Setting detail: SPECIMEN
Discharge: HOME | End: 2021-09-14
Payer: COMMERCIAL

## 2021-09-14 DIAGNOSIS — Z00.00 ROUTINE GENERAL MEDICAL EXAMINATION AT A HEALTH CARE FACILITY: ICD-10-CM

## 2021-09-14 DIAGNOSIS — Z00.00 ANNUAL PHYSICAL EXAM: ICD-10-CM

## 2021-09-14 PROCEDURE — 85025 COMPLETE CBC W/AUTO DIFF WBC: CPT

## 2021-09-14 PROCEDURE — 36415 COLL VENOUS BLD VENIPUNCTURE: CPT | Performed by: FAMILY MEDICINE

## 2021-09-14 PROCEDURE — 84443 ASSAY THYROID STIM HORMONE: CPT

## 2021-09-14 PROCEDURE — 80053 COMPREHEN METABOLIC PANEL: CPT

## 2021-09-15 LAB
ALBUMIN SERPL-MCNC: 4.3 G/DL (ref 3.5–4.6)
ALP BLD-CCNC: 45 U/L (ref 40–130)
ALT SERPL-CCNC: 17 U/L (ref 0–33)
ANION GAP SERPL CALCULATED.3IONS-SCNC: 10 MEQ/L (ref 9–15)
AST SERPL-CCNC: 19 U/L (ref 0–35)
BASOPHILS ABSOLUTE: 0 K/UL (ref 0–0.2)
BASOPHILS RELATIVE PERCENT: 0.9 %
BILIRUB SERPL-MCNC: 0.6 MG/DL (ref 0.2–0.7)
BUN BLDV-MCNC: 9 MG/DL (ref 6–20)
CALCIUM SERPL-MCNC: 9.1 MG/DL (ref 8.5–9.9)
CHLORIDE BLD-SCNC: 105 MEQ/L (ref 95–107)
CO2: 25 MEQ/L (ref 20–31)
CREAT SERPL-MCNC: 0.96 MG/DL (ref 0.5–0.9)
EOSINOPHILS ABSOLUTE: 0.1 K/UL (ref 0–0.7)
EOSINOPHILS RELATIVE PERCENT: 1.7 %
GFR AFRICAN AMERICAN: >60
GFR NON-AFRICAN AMERICAN: >60
GLOBULIN: 2.6 G/DL (ref 2.3–3.5)
GLUCOSE BLD-MCNC: 86 MG/DL (ref 70–99)
HCT VFR BLD CALC: 38.2 % (ref 37–47)
HEMOGLOBIN: 12.6 G/DL (ref 12–16)
LYMPHOCYTES ABSOLUTE: 1.1 K/UL (ref 1–4.8)
LYMPHOCYTES RELATIVE PERCENT: 24.6 %
MCH RBC QN AUTO: 31.2 PG (ref 27–31.3)
MCHC RBC AUTO-ENTMCNC: 33.1 % (ref 33–37)
MCV RBC AUTO: 94.3 FL (ref 82–100)
MONOCYTES ABSOLUTE: 0.3 K/UL (ref 0.2–0.8)
MONOCYTES RELATIVE PERCENT: 7.7 %
NEUTROPHILS ABSOLUTE: 2.9 K/UL (ref 1.4–6.5)
NEUTROPHILS RELATIVE PERCENT: 65.1 %
PDW BLD-RTO: 13.4 % (ref 11.5–14.5)
PLATELET # BLD: 269 K/UL (ref 130–400)
POTASSIUM SERPL-SCNC: 5 MEQ/L (ref 3.4–4.9)
RBC # BLD: 4.05 M/UL (ref 4.2–5.4)
SODIUM BLD-SCNC: 140 MEQ/L (ref 135–144)
TOTAL PROTEIN: 6.9 G/DL (ref 6.3–8)
TSH REFLEX: 1.39 UIU/ML (ref 0.44–3.86)
WBC # BLD: 4.4 K/UL (ref 4.8–10.8)

## 2021-09-15 ASSESSMENT — ENCOUNTER SYMPTOMS
GASTROINTESTINAL NEGATIVE: 1
RESPIRATORY NEGATIVE: 1

## 2022-08-17 ENCOUNTER — OFFICE VISIT (OUTPATIENT)
Dept: OBGYN CLINIC | Age: 32
End: 2022-08-17
Payer: COMMERCIAL

## 2022-08-17 VITALS — SYSTOLIC BLOOD PRESSURE: 112 MMHG | BODY MASS INDEX: 21.31 KG/M2 | DIASTOLIC BLOOD PRESSURE: 78 MMHG | WEIGHT: 132 LBS

## 2022-08-17 DIAGNOSIS — R31.9 HEMATURIA, UNSPECIFIED TYPE: ICD-10-CM

## 2022-08-17 DIAGNOSIS — Z01.419 WELL WOMAN EXAM WITH ROUTINE GYNECOLOGICAL EXAM: Primary | ICD-10-CM

## 2022-08-17 LAB
BILIRUBIN, POC: NORMAL
BLOOD URINE, POC: NORMAL
CLARITY, POC: NORMAL
COLOR, POC: NORMAL
GLUCOSE URINE, POC: NORMAL
KETONES, POC: NORMAL
LEUKOCYTE EST, POC: NORMAL
NITRITE, POC: NORMAL
PH, POC: 7
PROTEIN, POC: NORMAL
SPECIFIC GRAVITY, POC: 1.02
UROBILINOGEN, POC: NORMAL

## 2022-08-17 PROCEDURE — 99395 PREV VISIT EST AGE 18-39: CPT | Performed by: OBSTETRICS & GYNECOLOGY

## 2022-08-17 PROCEDURE — 81003 URINALYSIS AUTO W/O SCOPE: CPT | Performed by: OBSTETRICS & GYNECOLOGY

## 2022-08-17 ASSESSMENT — ENCOUNTER SYMPTOMS
ABDOMINAL DISTENTION: 0
ANAL BLEEDING: 0
BLOOD IN STOOL: 0
RECTAL PAIN: 0
CONSTIPATION: 0
RESPIRATORY NEGATIVE: 1
ALLERGIC/IMMUNOLOGIC NEGATIVE: 1
ABDOMINAL PAIN: 0
EYES NEGATIVE: 1
VOMITING: 0
NAUSEA: 0
DIARRHEA: 0

## 2022-08-17 ASSESSMENT — PATIENT HEALTH QUESTIONNAIRE - PHQ9
SUM OF ALL RESPONSES TO PHQ QUESTIONS 1-9: 0
SUM OF ALL RESPONSES TO PHQ QUESTIONS 1-9: 0
SUM OF ALL RESPONSES TO PHQ9 QUESTIONS 1 & 2: 0
SUM OF ALL RESPONSES TO PHQ QUESTIONS 1-9: 0
SUM OF ALL RESPONSES TO PHQ QUESTIONS 1-9: 0
1. LITTLE INTEREST OR PLEASURE IN DOING THINGS: 0
2. FEELING DOWN, DEPRESSED OR HOPELESS: 0

## 2022-08-17 ASSESSMENT — VISUAL ACUITY: OU: 1

## 2022-08-17 NOTE — PROGRESS NOTES
The patient was asked if she would like a chaperone present for her intimate exam. She  Declined the chaperone.  Gonzalo Herrmann MA

## 2022-08-17 NOTE — PROGRESS NOTES
Subjective:      Patient ID: John Steel is a 33 y.o. female    Annual exam.  Reviewed medical, surgical, social and family history. Also reviewed current medications and allergies. No GYN complaints. Normal cycles. Pap deferred ( negative co-testing 2021; next pap 2026 per AGOG and ASCCP Guidelines ). Encouraged monthly self breast exam.  Urology managing DI with Myrbetriq. Just increased dose. Had small amount of blood with wiping this AM.  Urine dip with ketone and blood; no nitrites or leuks. Sent for cx. Instructed to hydrate. F/U annual or prn. Vitals:  /78   Wt 132 lb (59.9 kg)   LMP 08/04/2022   BMI 21.31 kg/m²   Past Medical History:   Diagnosis Date    Anemia     DI (detrusor instability)      Past Surgical History:   Procedure Laterality Date    ANTERIOR CRUCIATE LIGAMENT REPAIR       Allergies:  Patient has no known allergies. Current Outpatient Medications   Medication Sig Dispense Refill    mirabegron (MYRBETRIQ) 25 MG TB24 Take 1 tablet by mouth daily (Patient taking differently: Take 50 mg by mouth daily) 90 tablet 2     No current facility-administered medications for this visit.      Social History     Socioeconomic History    Marital status:      Spouse name: Not on file    Number of children: Not on file    Years of education: Not on file    Highest education level: Not on file   Occupational History    Not on file   Tobacco Use    Smoking status: Never    Smokeless tobacco: Never   Substance and Sexual Activity    Alcohol use: No    Drug use: No    Sexual activity: Yes     Partners: Male     Birth control/protection: Condom   Other Topics Concern    Not on file   Social History Narrative    Not on file     Social Determinants of Health     Financial Resource Strain: Low Risk     Difficulty of Paying Living Expenses: Not hard at all   Food Insecurity: No Food Insecurity    Worried About [address] in the Last Year: Never true    [address] in the Last Year: Never true   Transportation Needs: Not on file   Physical Activity: Not on file   Stress: Not on file   Social Connections: Not on file   Intimate Partner Violence: Not on file   Housing Stability: Not on file     Family History   Problem Relation Age of Onset    No Known Problems Paternal Grandfather     No Known Problems Paternal Grandmother     No Known Problems Maternal Grandmother     No Known Problems Maternal Grandfather     No Known Problems Father     No Known Problems Mother     No Known Problems Brother     No Known Problems Sister     No Known Problems Other     Breast Cancer Neg Hx     Cancer Neg Hx     Colon Cancer Neg Hx     Diabetes Neg Hx     Eclampsia Neg Hx     Hypertension Neg Hx     Ovarian Cancer Neg Hx      Labor Neg Hx     Spont Abortions Neg Hx     Stroke Neg Hx        Review of Systems   Constitutional: Negative. Negative for activity change, appetite change, chills, diaphoresis, fatigue, fever and unexpected weight change. HENT: Negative. Eyes: Negative. Respiratory: Negative. Cardiovascular: Negative. Gastrointestinal:  Negative for abdominal distention, abdominal pain, anal bleeding, blood in stool, constipation, diarrhea, nausea, rectal pain and vomiting. Endocrine: Negative. Genitourinary:  Negative for decreased urine volume, difficulty urinating, dyspareunia, dysuria, enuresis, flank pain, frequency, genital sores, hematuria, menstrual problem, pelvic pain, urgency, vaginal bleeding, vaginal discharge and vaginal pain. Musculoskeletal: Negative. Skin: Negative. Allergic/Immunologic: Negative. Neurological: Negative. Hematological: Negative. Psychiatric/Behavioral: Negative. Objective:     Physical Exam  Constitutional:       Appearance: She is well-developed. HENT:      Head: Normocephalic. Eyes:      General: Lids are normal. Vision grossly intact. Neck:      Thyroid: No thyromegaly.    Cardiovascular:      Rate and Rhythm: Normal rate and regular rhythm. Heart sounds: Normal heart sounds. Pulmonary:      Effort: Pulmonary effort is normal. No respiratory distress. Breath sounds: Normal breath sounds. No wheezing or rales. Chest:      Chest wall: No tenderness. Breasts:     Right: Normal. No swelling, bleeding, inverted nipple, mass, nipple discharge, skin change, tenderness, axillary adenopathy or supraclavicular adenopathy. Left: Normal. No swelling, bleeding, inverted nipple, mass, nipple discharge, skin change, tenderness, axillary adenopathy or supraclavicular adenopathy. Abdominal:      General: There is no distension. Palpations: Abdomen is soft. There is no mass. Tenderness: There is no abdominal tenderness. There is no guarding or rebound. Hernia: No hernia is present. There is no hernia in the left inguinal area or right inguinal area. Genitourinary:     General: Normal vulva. Pubic Area: No rash. Labia:         Right: No rash, tenderness, lesion or injury. Left: No rash, tenderness, lesion or injury. Urethra: No prolapse, urethral swelling or urethral lesion. Vagina: Normal. No signs of injury and foreign body. No vaginal discharge, erythema, tenderness or bleeding. Cervix: No cervical motion tenderness, discharge or friability. Uterus: Not deviated, not enlarged, not fixed and not tender. Adnexa:         Right: No mass, tenderness or fullness. Left: No mass, tenderness or fullness. Rectum: Normal.   Musculoskeletal:         General: No tenderness. Normal range of motion. Cervical back: Normal range of motion and neck supple. Lymphadenopathy:      Cervical: No cervical adenopathy. Upper Body:      Right upper body: No supraclavicular or axillary adenopathy. Left upper body: No supraclavicular or axillary adenopathy. Lower Body: No right inguinal adenopathy. No left inguinal adenopathy.    Skin: General: Skin is warm and dry. Coloration: Skin is not pale. Findings: No erythema or rash. Neurological:      Mental Status: She is alert and oriented to person, place, and time. Psychiatric:         Behavior: Behavior normal.         Thought Content: Thought content normal.         Judgment: Judgment normal.       Assessment:       Diagnosis Orders   1. Well woman exam with routine gynecological exam             Plan:      Medications placedthis encounter:  No orders of the defined types were placed in this encounter. Orders placedthis encounter:  No orders of the defined types were placed in this encounter. Follow up:  Return in about 1 year (around 8/17/2023) for Annual.  Repeat Annual GYN exam every 1 year. Cervical Cytology exam starts age 24. If Negative Cytology;  follow-up screening per current guidelines. Mammograms yearly starting at age 36. Calcium and Vitamin D dosing reviewed ( age appropriate ). Colonoscopy and bone density screening discussed ( age appropriate ). Birth control and STD prevention discussed ( age appropriate ). Gardisil counseling completed for all patients ( age appropriate ). Routine health maintenance ( per PCP and guidelines ).

## 2022-08-19 LAB — URINE CULTURE, ROUTINE: NORMAL

## 2022-08-22 ENCOUNTER — TELEPHONE (OUTPATIENT)
Dept: OBGYN CLINIC | Age: 32
End: 2022-08-22

## 2024-02-07 ENCOUNTER — TELEPHONE (OUTPATIENT)
Dept: OBGYN CLINIC | Age: 34
End: 2024-02-07

## 2024-12-29 NOTE — ANESTHESIA PRE PROCEDURE
Department of Anesthesiology  Preprocedure Note       Name:  Francisco Torres   Age:  32 y.o.  :  1990                                          MRN:  [id number]         Date:  2018      Surgeon: * No surgeons listed *    Procedure: * No procedures listed *    Medications prior to admission:   Prior to Admission medications    Medication Sig Start Date End Date Taking? Authorizing Provider   ferrous sulfate (VILMA-MÓNICA) 325 (65 FE) MG tablet Take 1 tablet by mouth 3 times daily (with meals) 3/17/17  Yes Malka Cox MD   Prenatal Vit-Fe Fumarate-FA (PRENATAL VITAMIN PO) Take by mouth   Yes Historical Provider, MD   Misc.  Devices (BREAST PUMP) MISC 1 kit by Does not apply route as needed (pumping breast milk) 18   Malka Cox MD   ferrous sulfate (VILMA-MÓNICA) 325 (65 Fe) MG tablet Take 1 tablet by mouth 3 times daily (with meals) 18   Malka Cox MD   Eumwsi-CtBkr-Voufj-FA-DHA w/oA [place]) 30-1.4-200 MG CPCR Lot [id number] Exp. 10/2018 Samples given to patient 18   Malka Cox MD   Zinc Oxide (DR SMITHS DIAPER) 10 % OINT Lot [id number] Exp none - Samples given to patient 0.5 gm 18   Malka Cox MD       Current medications:    Current Facility-Administered Medications   Medication Dose Route Frequency Provider Last Rate Last Dose    lactated ringers infusion   Intravenous Continuous Malka Cox  mL/hr at 18 0756      sodium chloride flush 0.9 % injection 10 mL  10 mL Intravenous 2 times per day Malka Cox MD        sodium chloride flush 0.9 % injection 10 mL  10 mL Intravenous PRN Malka Cox MD   10 mL at 18 0755    lidocaine PF 1 % injection 30 mL  30 mL Other PRN Malka Cox MD        nalbuphine (NUBAIN) injection 10 mg  10 mg Intravenous Q2H PRN Malka Cox MD        ibuprofen (ADVIL;MOTRIN) tablet 800 mg  800 mg Oral 3 times per day Malka Cox MD        oxytocin (PITOCIN) 30 units in 500 mL infusion  1 charles-units/min Lab Results   Component Value Date    EGFR 29 07/26/2024    EGFR 23 06/13/2024    EGFR 18 05/30/2024    CREATININE 2.18 (H) 12/29/2024    CREATININE 2.23 (H) 12/28/2024    CREATININE 1.94 (H) 11/20/2024     See above   Intravenous Continuous Ben Downey MD 1 mL/hr at 08/02/18 0803 1 charles-units/min at 08/02/18 0803    diphenhydrAMINE (BENADRYL) injection 25 mg  25 mg Intravenous Q4H PRN Ben Downey MD        [facility]) chewable tablet 80 mg  80 mg Oral Q6H PRN Ben Downey MD        docusate sodium (COLACE) capsule 100 mg  100 mg Oral BID Ben Downey MD        bisacodyl (DULCOLAX) suppository 10 mg  10 mg Rectal Daily PRN Ben Downey MD        [facility] injection 4 mg  4 mg Intravenous Q6H PRN Ben Downey MD        terbutaline (BRETHINE) injection 0.25 mg  0.25 mg Subcutaneous Once Ben Downey MD   Stopped at 08/01/18 2151    oxytocin (PITOCIN) 30 units in 500 mL infusion  1 charles-units/min Intravenous Continuous PRN Ben Downey MD        methylergonovine (METHERGINE) injection 200 mcg  200 mcg Intramuscular PRN Ben Downey MD        carboprost (HEMABATE) injection 250 mcg  250 mcg Intramuscular PRN Ben Downey MD        misoprostol (CYTOTEC) tablet 800 mcg  800 mcg Rectal PRN Dewain Finder, MD        witch hazel-glycerin (TUCKS) pad   Topical PRN Ben Downey MD        benzocaine-menthol (DERMOPLAST) 20-0.5 % spray   Topical PRN Ben Downey MD           Allergies:  No Known Allergies    Problem List:    Patient Active Problem List   Diagnosis Code    Normal labor and delivery O80    Term pregnancy Z34.80    Normal labor O80, Z37.9       Past Medical History:        Diagnosis Date    Anemia        Past Surgical History:        Procedure Laterality Date    ANTERIOR CRUCIATE LIGAMENT REPAIR         Social History:    Social History   Substance Use Topics    Smoking status: Never Smoker    Smokeless tobacco: Never Used    Alcohol use No                                Counseling given: Not Answered      Vital Signs (Current):   Vitals:    08/01/18 2200 08/02/18 0725 08/02/18 0828 08/02/18 0830   BP:  112/74  (!) 104/56   Pulse:  63  66   Resp: